# Patient Record
Sex: MALE | Race: WHITE | NOT HISPANIC OR LATINO | Employment: UNEMPLOYED | URBAN - METROPOLITAN AREA
[De-identification: names, ages, dates, MRNs, and addresses within clinical notes are randomized per-mention and may not be internally consistent; named-entity substitution may affect disease eponyms.]

---

## 2023-02-18 ENCOUNTER — OFFICE VISIT (OUTPATIENT)
Dept: URGENT CARE | Facility: CLINIC | Age: 43
End: 2023-02-18

## 2023-02-18 VITALS
OXYGEN SATURATION: 99 % | TEMPERATURE: 97.1 F | WEIGHT: 198.2 LBS | BODY MASS INDEX: 29.36 KG/M2 | HEIGHT: 69 IN | DIASTOLIC BLOOD PRESSURE: 102 MMHG | HEART RATE: 66 BPM | RESPIRATION RATE: 18 BRPM | SYSTOLIC BLOOD PRESSURE: 142 MMHG

## 2023-02-18 DIAGNOSIS — Z02.4 DRIVER'S PERMIT PE (PHYSICAL EXAMINATION): Primary | ICD-10-CM

## 2023-02-18 NOTE — PROGRESS NOTES
[address] [facility]        NAME: Javon Parada is a 43 y o  male  : 1980    MRN: [id number]  DATE: 2023  TIME: 10:05 AM    Assessment and Plan   's permit PE (physical examination) [Z02 4]  1  's permit PE (physical examination)          BP elevated here but patient denies CP, SOB, headaches, dizziness, visual changes  Recent life stressor with move from [facility]  Patient is planning to establish with PCP next door       Patient Instructions       Follow up with PCP in 3-5 days  Proceed to  ER if symptoms worsen  Chief Complaint     Chief Complaint   Patient presents with   • Annual Exam     Drivers permit         History of Present Illness       Patient is a 43 male here for permit physical  No significant past medical or surgical history  Not on any medications  Denies any cardiac or neurologic conditions, epilepsy, diabetes, hypertension, circulatory disorders, cognitive impairment, and any alcohol or drug abuse  Review of Systems   Review of Systems   Constitutional: Negative  Negative for chills, diaphoresis, fatigue, fever and unexpected weight change  HENT: Negative  Negative for hearing loss, nosebleeds, sinus pressure, sinus pain, sore throat, trouble swallowing and voice change  Eyes: Negative  Negative for visual disturbance  Respiratory: Negative  Negative for chest tightness, shortness of breath and wheezing  Cardiovascular: Negative  Negative for chest pain and palpitations  Gastrointestinal: Negative  Negative for abdominal pain, diarrhea, nausea and vomiting  Endocrine: Negative  Genitourinary: Negative  Negative for dysuria  Musculoskeletal: Negative  Negative for back pain and neck pain  Skin: Negative  Negative for pallor and rash  Allergic/Immunologic: Negative  Neurological: Negative  Negative for dizziness, seizures, syncope, weakness, light-headedness, numbness and headaches  Hematological: Negative  Psychiatric/Behavioral: Negative  Current Medications     No current outpatient medications on file  Current Allergies     Allergies as of 02/18/2023   • (No Known Allergies)            The following portions of the patient's history were reviewed and updated as appropriate: allergies, current medications, past family history, past medical history, past social history, past surgical history and problem list      No past medical history on file  No past surgical history on file  No family history on file  Medications have been verified  Objective   BP (!) 142/102   Pulse 66   Temp (!) 97 1 °F (36 2 °C)   Resp 18   Ht 5' 9 29" (1 76 m)   Wt 89 9 kg (198 lb 3 2 oz)   SpO2 99%   BMI 29 02 kg/m²        Physical Exam     Physical Exam  Constitutional:       Appearance: Normal appearance  HENT:      Head: Normocephalic  Right Ear: Tympanic membrane, ear canal and external ear normal       Left Ear: Tympanic membrane, ear canal and external ear normal       Nose: Nose normal       Mouth/Throat:      Mouth: Mucous membranes are moist       Pharynx: Oropharynx is clear  Eyes:      Extraocular Movements: Extraocular movements intact  Conjunctiva/sclera: Conjunctivae normal       Pupils: Pupils are equal, round, and reactive to light  Cardiovascular:      Rate and Rhythm: Normal rate and regular rhythm  Pulses: Normal pulses  Heart sounds: Normal heart sounds  Pulmonary:      Effort: Pulmonary effort is normal       Breath sounds: Normal breath sounds  Abdominal:      General: Bowel sounds are normal       Palpations: Abdomen is soft  Tenderness: There is no abdominal tenderness  Hernia: No hernia is present  Musculoskeletal:         General: No swelling, tenderness or deformity  Normal range of motion  Cervical back: Normal range of motion  Right lower leg: No edema  Left lower leg: No edema     Skin:     General: Skin is warm and dry       Capillary Refill: Capillary refill takes less than 2 seconds  Neurological:      General: No focal deficit present  Mental Status: He is alert and oriented to person, place, and time  Motor: Motor function is intact  Coordination: Coordination is intact  Gait: Gait is intact  Deep Tendon Reflexes: Reflexes are normal and symmetric     Psychiatric:         Mood and Affect: Mood normal          Behavior: Behavior normal

## 2023-05-26 ENCOUNTER — OFFICE VISIT (OUTPATIENT)
Dept: URGENT CARE | Facility: CLINIC | Age: 43
End: 2023-05-26

## 2023-05-26 VITALS
SYSTOLIC BLOOD PRESSURE: 136 MMHG | BODY MASS INDEX: 28.09 KG/M2 | TEMPERATURE: 97.7 F | DIASTOLIC BLOOD PRESSURE: 92 MMHG | HEART RATE: 83 BPM | RESPIRATION RATE: 18 BRPM | OXYGEN SATURATION: 97 % | WEIGHT: 191.8 LBS

## 2023-05-26 DIAGNOSIS — M54.50 ACUTE LEFT-SIDED LOW BACK PAIN WITHOUT SCIATICA: Primary | ICD-10-CM

## 2023-05-26 LAB
SL AMB  POCT GLUCOSE, UA: NEGATIVE
SL AMB LEUKOCYTE ESTERASE,UA: NEGATIVE
SL AMB POCT BILIRUBIN,UA: NEGATIVE
SL AMB POCT BLOOD,UA: NEGATIVE
SL AMB POCT CLARITY,UA: CLEAR
SL AMB POCT COLOR,UA: NORMAL
SL AMB POCT KETONES,UA: NEGATIVE
SL AMB POCT NITRITE,UA: NEGATIVE
SL AMB POCT PH,UA: 6
SL AMB POCT SPECIFIC GRAVITY,UA: 1.03
SL AMB POCT URINE PROTEIN: NEGATIVE
SL AMB POCT UROBILINOGEN: 0.2

## 2023-05-26 RX ORDER — METHOCARBAMOL 500 MG/1
500 TABLET, FILM COATED ORAL 3 TIMES DAILY
Qty: 9 TABLET | Refills: 0 | Status: SHIPPED | OUTPATIENT
Start: 2023-05-26

## 2023-05-26 RX ORDER — AMOXICILLIN AND CLAVULANATE POTASSIUM 875; 125 MG/1; MG/1
875 TABLET, FILM COATED ORAL 2 TIMES DAILY
COMMUNITY
Start: 2023-05-20 | End: 2023-05-30

## 2023-05-26 RX ORDER — PREDNISONE 20 MG/1
TABLET ORAL
COMMUNITY
Start: 2023-05-20

## 2023-05-26 NOTE — PROGRESS NOTES
[address] [facility]        NAME: Dione Sandy is a 43 y o  male  : 1980    MRN: [id number]  DATE: May 26, 2023  TIME: 2:46 PM    Assessment and Plan   Acute left-sided low back pain without sciatica [M54 50]  1  Acute left-sided low back pain without sciatica  POCT urine dip    methocarbamol (ROBAXIN) 500 mg tablet            Patient Instructions   Take Robaxin as prescribed  Take advil as directed by bottle  Make sure you are taking the medication with food  Do not drive or operate heavy machinery while taking Robaxin  Patient is to apply heat 20 minutes on and than 20 minutes off  Patient can continue to use icy/hot or try otc lidocaine patches but neither should be applied while using heat  Rest (for no longer than 24 hours)  Stretching exercises  Alternate ice and heat  Consider physical therapy if no improvement after 1 week  F/u with PCP in 1 week if back pain is not improving  Follow up with chiropractic or orthopedic if continues >1 month  Report to the ER with worsening pain, lower extremity numbness/tingling/weakness or loss of bowel/bladder function  Follow up with PCP in 3-5 days  Proceed to  ER if symptoms worsen  Chief Complaint     Chief Complaint   Patient presents with   • Back Pain     Pt  States this morning he started having back pain on the left side of his back around the kidneys  Pt  Denies any burning with urination  Pt  Has taken advil this morning with no relief  History of Present Illness       HPI   used [id number]  This is a 44 y/o male here c/o left lower back pain which radiates down his left leg since this morning  Denies any injury or trauma to the leg  Denies any urinary symptoms, numbness/tingling of the lower extremities, loss of bowel or bladder function  Rates pain a 7/10  Took advil for the symptoms  Denies fever, chills, SOB, chest pain, n/v/d  Review of Systems   Review of Systems   Constitutional: Negative for chills and fever  Respiratory: Negative for cough and shortness of breath  Cardiovascular: Negative for chest pain  Gastrointestinal: Negative for diarrhea, nausea and vomiting  Genitourinary: Negative for dysuria, frequency, hematuria and urgency  Musculoskeletal: Positive for back pain  Current Medications       Current Outpatient Medications:   •  amoxicillin-clavulanate (AUGMENTIN) 875-125 mg per tablet, Take 875 mg by mouth 2 (two) times a day, Disp: , Rfl:   •  methocarbamol (ROBAXIN) 500 mg tablet, Take 1 tablet (500 mg total) by mouth 3 (three) times a day, Disp: 9 tablet, Rfl: 0  •  predniSONE 20 mg tablet, , Disp: , Rfl:     Current Allergies     Allergies as of 05/26/2023 - Reviewed 05/26/2023   Allergen Reaction Noted   • Strawberry extract - food allergy Rash 03/06/2023            The following portions of the patient's history were reviewed and updated as appropriate: allergies, current medications, past family history, past medical history, past social history, past surgical history and problem list      History reviewed  No pertinent past medical history  History reviewed  No pertinent surgical history  History reviewed  No pertinent family history  Medications have been verified  Objective   /92   Pulse 83   Temp 97 7 °F (36 5 °C)   Resp 18   Wt 87 kg (191 lb 12 8 oz)   SpO2 97%   BMI 28 09 kg/m²        Physical Exam     Physical Exam  Vitals and nursing note reviewed  Constitutional:       General: He is not in acute distress  Appearance: Normal appearance  He is not ill-appearing or toxic-appearing  Cardiovascular:      Rate and Rhythm: Normal rate and regular rhythm  Pulmonary:      Effort: Pulmonary effort is normal       Breath sounds: Normal breath sounds  Abdominal:      General: Abdomen is flat  There is no distension  Palpations: Abdomen is soft  Tenderness: There is no abdominal tenderness   There is no right CVA tenderness, left CVA tenderness or guarding  Musculoskeletal:      Thoracic back: Normal       Lumbar back: Tenderness present  No swelling, lacerations, spasms or bony tenderness  Decreased range of motion  Negative right straight leg raise test and negative left straight leg raise test       Comments: Tenderness to the right lumbar paraspinal muscles  Decreased ROM with flexion due to pain  Neurological:      Mental Status: He is alert        Gait: Gait normal

## 2024-11-09 ENCOUNTER — HOSPITAL ENCOUNTER (INPATIENT)
Facility: HOSPITAL | Age: 44
LOS: 1 days | Discharge: HOME | DRG: 347 | End: 2024-11-12
Attending: EMERGENCY MEDICINE | Admitting: FAMILY MEDICINE
Payer: COMMERCIAL

## 2024-11-09 DIAGNOSIS — M54.9 BACK PAIN: Primary | ICD-10-CM

## 2024-11-09 DIAGNOSIS — R26.2 AMBULATORY DYSFUNCTION: ICD-10-CM

## 2024-11-09 DIAGNOSIS — M54.2 NECK PAIN: ICD-10-CM

## 2024-11-09 PROBLEM — M54.59 INTRACTABLE LOW BACK PAIN: Status: ACTIVE | Noted: 2024-11-09

## 2024-11-09 PROBLEM — I95.9 HYPOTENSION: Status: ACTIVE | Noted: 2024-11-09

## 2024-11-09 LAB
ERYTHROCYTE [DISTWIDTH] IN BLOOD BY AUTOMATED COUNT: 12.8 % (ref 11.6–15.1)
GLUCOSE SERPL-MCNC: 107 MG/DL (ref 65–140)
HCT VFR BLD AUTO: 47.2 % (ref 36.5–49.3)
HGB BLD-MCNC: 15.9 G/DL (ref 12–17)
MCH RBC QN AUTO: 29.9 PG (ref 26.8–34.3)
MCHC RBC AUTO-ENTMCNC: 33.7 G/DL (ref 31.4–37.4)
MCV RBC AUTO: 89 FL (ref 82–98)
PLATELET # BLD AUTO: 314 THOUSANDS/UL (ref 149–390)
PMV BLD AUTO: 10.6 FL (ref 8.9–12.7)
RBC # BLD AUTO: 5.31 MILLION/UL (ref 3.88–5.62)
WBC # BLD AUTO: 13.18 THOUSAND/UL (ref 4.31–10.16)

## 2024-11-09 PROCEDURE — 85027 COMPLETE CBC AUTOMATED: CPT | Performed by: PHYSICIAN ASSISTANT

## 2024-11-09 PROCEDURE — 80053 COMPREHEN METABOLIC PANEL: CPT | Performed by: PHYSICIAN ASSISTANT

## 2024-11-09 PROCEDURE — 99285 EMERGENCY DEPT VISIT HI MDM: CPT | Performed by: EMERGENCY MEDICINE

## 2024-11-09 PROCEDURE — 82948 REAGENT STRIP/BLOOD GLUCOSE: CPT

## 2024-11-09 PROCEDURE — 99283 EMERGENCY DEPT VISIT LOW MDM: CPT

## 2024-11-09 PROCEDURE — 36415 COLL VENOUS BLD VENIPUNCTURE: CPT | Performed by: PHYSICIAN ASSISTANT

## 2024-11-09 PROCEDURE — 96372 THER/PROPH/DIAG INJ SC/IM: CPT

## 2024-11-09 RX ORDER — METHOCARBAMOL 500 MG/1
500 TABLET, FILM COATED ORAL EVERY 6 HOURS SCHEDULED
Status: DISCONTINUED | OUTPATIENT
Start: 2024-11-10 | End: 2024-11-09

## 2024-11-09 RX ORDER — LIDOCAINE 50 MG/G
3 PATCH TOPICAL DAILY PRN
Status: DISCONTINUED | OUTPATIENT
Start: 2024-11-09 | End: 2024-11-12 | Stop reason: HOSPADM

## 2024-11-09 RX ORDER — MAGNESIUM HYDROXIDE/ALUMINUM HYDROXICE/SIMETHICONE 120; 1200; 1200 MG/30ML; MG/30ML; MG/30ML
30 SUSPENSION ORAL EVERY 6 HOURS PRN
Status: DISCONTINUED | OUTPATIENT
Start: 2024-11-09 | End: 2024-11-12 | Stop reason: HOSPADM

## 2024-11-09 RX ORDER — METHYLPREDNISOLONE SODIUM SUCCINATE 125 MG/2ML
125 INJECTION, POWDER, LYOPHILIZED, FOR SOLUTION INTRAMUSCULAR; INTRAVENOUS ONCE
Status: COMPLETED | OUTPATIENT
Start: 2024-11-09 | End: 2024-11-09

## 2024-11-09 RX ORDER — NICOTINE 21 MG/24HR
1 PATCH, TRANSDERMAL 24 HOURS TRANSDERMAL DAILY
Status: DISCONTINUED | OUTPATIENT
Start: 2024-11-10 | End: 2024-11-12 | Stop reason: HOSPADM

## 2024-11-09 RX ORDER — ALPRAZOLAM 0.25 MG/1
0.25 TABLET ORAL DAILY PRN
COMMUNITY
Start: 2024-08-13 | End: 2024-11-12

## 2024-11-09 RX ORDER — POLYETHYLENE GLYCOL 3350 17 G/17G
17 POWDER, FOR SOLUTION ORAL DAILY PRN
Status: DISCONTINUED | OUTPATIENT
Start: 2024-11-09 | End: 2024-11-12 | Stop reason: HOSPADM

## 2024-11-09 RX ORDER — OXYCODONE HYDROCHLORIDE 5 MG/1
5 TABLET ORAL EVERY 6 HOURS PRN
Qty: 15 TABLET | Refills: 0 | Status: SHIPPED | OUTPATIENT
Start: 2024-11-09 | End: 2024-11-12

## 2024-11-09 RX ORDER — SILDENAFIL 100 MG/1
100 TABLET, FILM COATED ORAL DAILY PRN
COMMUNITY
Start: 2024-11-08 | End: 2024-11-12

## 2024-11-09 RX ORDER — ACETAMINOPHEN 10 MG/ML
1000 INJECTION, SOLUTION INTRAVENOUS EVERY 8 HOURS PRN
Status: DISCONTINUED | OUTPATIENT
Start: 2024-11-09 | End: 2024-11-10

## 2024-11-09 RX ORDER — PREDNISONE 20 MG/1
40 TABLET ORAL DAILY
Qty: 10 TABLET | Refills: 0 | Status: SHIPPED | OUTPATIENT
Start: 2024-11-10 | End: 2024-11-12

## 2024-11-09 RX ORDER — ALPRAZOLAM 0.25 MG/1
0.25 TABLET ORAL
Status: DISCONTINUED | OUTPATIENT
Start: 2024-11-09 | End: 2024-11-12 | Stop reason: HOSPADM

## 2024-11-09 RX ORDER — SERTRALINE HYDROCHLORIDE 100 MG/1
100 TABLET, FILM COATED ORAL DAILY
COMMUNITY
Start: 2024-11-08 | End: 2024-11-12

## 2024-11-09 RX ORDER — METHOCARBAMOL 500 MG/1
500 TABLET, FILM COATED ORAL EVERY 6 HOURS SCHEDULED
Status: DISCONTINUED | OUTPATIENT
Start: 2024-11-10 | End: 2024-11-10

## 2024-11-09 RX ORDER — MORPHINE SULFATE 10 MG/ML
8 INJECTION, SOLUTION INTRAMUSCULAR; INTRAVENOUS ONCE
Status: COMPLETED | OUTPATIENT
Start: 2024-11-09 | End: 2024-11-09

## 2024-11-09 RX ORDER — KETOROLAC TROMETHAMINE 30 MG/ML
15 INJECTION, SOLUTION INTRAMUSCULAR; INTRAVENOUS EVERY 6 HOURS PRN
Status: DISCONTINUED | OUTPATIENT
Start: 2024-11-09 | End: 2024-11-10

## 2024-11-09 RX ORDER — SERTRALINE HYDROCHLORIDE 100 MG/1
100 TABLET, FILM COATED ORAL DAILY
Status: DISCONTINUED | OUTPATIENT
Start: 2024-11-10 | End: 2024-11-12 | Stop reason: HOSPADM

## 2024-11-09 RX ADMIN — METHYLPREDNISOLONE SODIUM SUCCINATE 125 MG: 125 INJECTION, POWDER, FOR SOLUTION INTRAMUSCULAR; INTRAVENOUS at 21:59

## 2024-11-09 RX ADMIN — MORPHINE SULFATE 8 MG: 10 INJECTION INTRAVENOUS at 21:59

## 2024-11-09 NOTE — Clinical Note
Garth Lo was seen and treated in our emergency department on 11/9/2024.                Diagnosis:     Garth  may return to work on return date.    He may return on this date: 11/16/2024         If you have any questions or concerns, please don't hesitate to call.      Tim Carrero MD    ______________________________           _______________          _______________  Hospital Representative                              Date                                Time

## 2024-11-10 ENCOUNTER — APPOINTMENT (OUTPATIENT)
Dept: RADIOLOGY | Facility: HOSPITAL | Age: 44
DRG: 347 | End: 2024-11-10
Payer: COMMERCIAL

## 2024-11-10 LAB
ALBUMIN SERPL BCG-MCNC: 4.1 G/DL (ref 3.5–5)
ALP SERPL-CCNC: 69 U/L (ref 34–104)
ALT SERPL W P-5'-P-CCNC: 15 U/L (ref 7–52)
ANION GAP SERPL CALCULATED.3IONS-SCNC: 7 MMOL/L (ref 4–13)
AST SERPL W P-5'-P-CCNC: 18 U/L (ref 13–39)
BILIRUB SERPL-MCNC: 0.34 MG/DL (ref 0.2–1)
BUN SERPL-MCNC: 13 MG/DL (ref 5–25)
CALCIUM SERPL-MCNC: 9.1 MG/DL (ref 8.4–10.2)
CHLORIDE SERPL-SCNC: 107 MMOL/L (ref 96–108)
CO2 SERPL-SCNC: 24 MMOL/L (ref 21–32)
CREAT SERPL-MCNC: 1.06 MG/DL (ref 0.6–1.3)
GFR SERPL CREATININE-BSD FRML MDRD: 84 ML/MIN/1.73SQ M
GLUCOSE SERPL-MCNC: 92 MG/DL (ref 65–140)
POTASSIUM SERPL-SCNC: 4 MMOL/L (ref 3.5–5.3)
PROT SERPL-MCNC: 6.9 G/DL (ref 6.4–8.4)
SODIUM SERPL-SCNC: 138 MMOL/L (ref 135–147)

## 2024-11-10 PROCEDURE — 99223 1ST HOSP IP/OBS HIGH 75: CPT | Performed by: PHYSICIAN ASSISTANT

## 2024-11-10 PROCEDURE — 99232 SBSQ HOSP IP/OBS MODERATE 35: CPT | Performed by: FAMILY MEDICINE

## 2024-11-10 PROCEDURE — 72110 X-RAY EXAM L-2 SPINE 4/>VWS: CPT

## 2024-11-10 RX ORDER — ACETAMINOPHEN 325 MG/1
975 TABLET ORAL EVERY 8 HOURS SCHEDULED
Status: DISCONTINUED | OUTPATIENT
Start: 2024-11-10 | End: 2024-11-12 | Stop reason: HOSPADM

## 2024-11-10 RX ORDER — LIDOCAINE 50 MG/G
1 PATCH TOPICAL DAILY
Status: DISCONTINUED | OUTPATIENT
Start: 2024-11-10 | End: 2024-11-12 | Stop reason: HOSPADM

## 2024-11-10 RX ORDER — DEXAMETHASONE SODIUM PHOSPHATE 10 MG/ML
8 INJECTION, SOLUTION INTRAMUSCULAR; INTRAVENOUS ONCE
Status: COMPLETED | OUTPATIENT
Start: 2024-11-10 | End: 2024-11-10

## 2024-11-10 RX ORDER — KETOROLAC TROMETHAMINE 30 MG/ML
15 INJECTION, SOLUTION INTRAMUSCULAR; INTRAVENOUS EVERY 6 HOURS SCHEDULED
Status: DISCONTINUED | OUTPATIENT
Start: 2024-11-10 | End: 2024-11-11

## 2024-11-10 RX ORDER — OXYCODONE HYDROCHLORIDE 5 MG/1
5 TABLET ORAL EVERY 4 HOURS PRN
Status: DISCONTINUED | OUTPATIENT
Start: 2024-11-10 | End: 2024-11-11

## 2024-11-10 RX ORDER — METHOCARBAMOL 500 MG/1
1000 TABLET, FILM COATED ORAL EVERY 6 HOURS SCHEDULED
Status: DISCONTINUED | OUTPATIENT
Start: 2024-11-10 | End: 2024-11-11

## 2024-11-10 RX ADMIN — DEXAMETHASONE SODIUM PHOSPHATE 8 MG: 10 INJECTION, SOLUTION INTRAMUSCULAR; INTRAVENOUS at 10:20

## 2024-11-10 RX ADMIN — METHOCARBAMOL TABLETS 1000 MG: 500 TABLET, COATED ORAL at 13:07

## 2024-11-10 RX ADMIN — METHOCARBAMOL TABLETS 500 MG: 500 TABLET, COATED ORAL at 00:11

## 2024-11-10 RX ADMIN — KETOROLAC TROMETHAMINE 15 MG: 30 INJECTION, SOLUTION INTRAMUSCULAR at 22:14

## 2024-11-10 RX ADMIN — LIDOCAINE 1 PATCH: 50 PATCH CUTANEOUS at 09:31

## 2024-11-10 RX ADMIN — LIDOCAINE 3 PATCH: 50 PATCH CUTANEOUS at 00:20

## 2024-11-10 RX ADMIN — SERTRALINE 100 MG: 100 TABLET, FILM COATED ORAL at 09:30

## 2024-11-10 RX ADMIN — KETOROLAC TROMETHAMINE 15 MG: 30 INJECTION, SOLUTION INTRAMUSCULAR at 00:28

## 2024-11-10 RX ADMIN — METHOCARBAMOL TABLETS 1000 MG: 500 TABLET, COATED ORAL at 17:09

## 2024-11-10 RX ADMIN — ACETAMINOPHEN 975 MG: 325 TABLET, FILM COATED ORAL at 15:00

## 2024-11-10 RX ADMIN — ACETAMINOPHEN 975 MG: 325 TABLET, FILM COATED ORAL at 09:30

## 2024-11-10 RX ADMIN — OXYCODONE HYDROCHLORIDE 5 MG: 5 TABLET ORAL at 17:16

## 2024-11-10 RX ADMIN — KETOROLAC TROMETHAMINE 15 MG: 30 INJECTION, SOLUTION INTRAMUSCULAR at 15:01

## 2024-11-10 RX ADMIN — KETOROLAC TROMETHAMINE 15 MG: 30 INJECTION, SOLUTION INTRAMUSCULAR at 09:31

## 2024-11-10 RX ADMIN — METHOCARBAMOL TABLETS 500 MG: 500 TABLET, COATED ORAL at 05:54

## 2024-11-10 RX ADMIN — OXYCODONE HYDROCHLORIDE 5 MG: 5 TABLET ORAL at 09:30

## 2024-11-10 NOTE — ASSESSMENT & PLAN NOTE
Episode while in the ED of hypotension after receiving IV morphine, currently BP is stabilizing with systolic in the 140s  Patient also had syncopal episode after receiving morphine and is still drowsy, but easily alert to name and answers all questions appropriately, alert and oriented x 4  Will avoid further IV morphine doses at this time and patient requesting not to have any more IV morphine  Monitor BP  This has resolved

## 2024-11-10 NOTE — ASSESSMENT & PLAN NOTE
Secondary to acute on chronic low back pain with radiation down the legs  PT/OT eval  MRI C and L-spine ordered given significant pain.  Patient is even having difficulty moving in bed

## 2024-11-10 NOTE — PROGRESS NOTES
Progress Note - Hospitalist   Name: Garth Lo 44 y.o. male I MRN: [id number]  Unit/Bed#: -01 I Date of Admission: 11/9/2024   Date of Service: 11/10/2024 I Hospital Day: 0    Assessment & Plan  Intractable low back pain  Acute on chronic bilateral lumbar back pain with radiation of pain down legs without numbness, no relief with over-the-counter pain medications  Patient did follow-up outpatient with physical therapy and PCP who did order an outpatient MRI, but due to worsening pain came to the ED with no relief after IV Solu-Medrol IV morphine so will order MRI C and L-spine while here  As needed pain medication, start Robaxin every 6 hours scheduled.  May need to consider orthopedic evaluation once MRI is done  PT/OT eval  Ambulatory dysfunction  Secondary to acute on chronic low back pain with radiation down the legs  PT/OT eval  MRI C and L-spine ordered given significant pain.  Patient is even having difficulty moving in bed  Cervical spine pain  Acute on chronic, pain with associated bilateral arm pain and tingling down arms into fourth and fifth fingers which is ongoing for 3 years after injury in the [place] war, no change in tingling but pain is worse  MRI C-spine ordered  As needed pain medication and scheduled muscle relaxer  PT/OT eval.  Will likely evaluate after MRI  Hypotension  Episode while in the ED of hypotension after receiving IV morphine, currently BP is stabilizing with systolic in the 140s  Patient also had syncopal episode after receiving morphine and is still drowsy, but easily alert to name and answers all questions appropriately, alert and oriented x 4  Will avoid further IV morphine doses at this time and patient requesting not to have any more IV morphine  Monitor BP  This has resolved    VTE Pharmacologic Prophylaxis: VTE Score: 2 Low Risk (Score 0-2) - Encourage Ambulation.    Mobility:   Basic Mobility Inpatient Raw Score: 20  JH-HLM Goal: 6: Walk 10 steps or more  JH-HLM  Achieved: 7: Walk 25 feet or more  -HL Goal achieved. Continue to encourage appropriate mobility.    Patient Centered Rounds: I performed bedside rounds with nursing staff today.       Education and Discussions with Family / Patient: Updated  (wife) at bedside.    Current Length of Stay: 0 day(s)  Current Patient Status: Observation   Certification Statement: The patient, admitted on an observation basis, will now require > 2 midnight hospital stay due to having intractable pain needing multimodal pain regimen as well as further workup with a MRI.  Will keep the patient observation for now.  If he is able to be discharged tomorrow that is fine but if he needs hospitalization beyond tomorrow with ongoing pain management may consider switching over to inpatient at that time  Discharge Plan: Anticipate discharge in 24-48 hrs to home.    Code Status: Level 1 - Full Code    Subjective   Patient seen and examined.  States his pain is really significant.    Objective :  Temp:  [97.4 °F (36.3 °C)-98.6 °F (37 °C)] 97.4 °F (36.3 °C)  HR:  [46-94] 89  BP: ()/(62-95) 130/88  Resp:  [14-18] 18  SpO2:  [93 %-99 %] 96 %  O2 Device: None (Room air)  Nasal Cannula O2 Flow Rate (L/min):  [3 L/min] 3 L/min    Body mass index is 28.73 kg/m².     Input and Output Summary (last 24 hours):   No intake or output data in the 24 hours ending 11/10/24 1037    Physical Exam  General Appearance:    Alert, cooperative, no distress, appears stated age                               Lungs:     Clear to auscultation bilaterally, respirations unlabored       Heart:    Regular rate and rhythm, S1 and S2 normal, no murmur, rub    or gallop   Abdomen:     Soft, non-tender, bowel sounds active all four quadrants,     no masses, no organomegaly           Extremities:   Extremities normal, atraumatic, no cyanosis or edema   Straight leg raise positive bilateral.                      Lines/Drains:              Lab Results: I have  reviewed the following results:   Results from last 7 days   Lab Units 11/09/24  2322   WBC Thousand/uL 13.18*   HEMOGLOBIN g/dL 15.9   HEMATOCRIT % 47.2   PLATELETS Thousands/uL 314     Results from last 7 days   Lab Units 11/09/24  2322   SODIUM mmol/L 138   POTASSIUM mmol/L 4.0   CHLORIDE mmol/L 107   CO2 mmol/L 24   BUN mg/dL 13   CREATININE mg/dL 1.06   ANION GAP mmol/L 7   CALCIUM mg/dL 9.1   ALBUMIN g/dL 4.1   TOTAL BILIRUBIN mg/dL 0.34   ALK PHOS U/L 69   ALT U/L 15   AST U/L 18   GLUCOSE RANDOM mg/dL 92         Results from last 7 days   Lab Units 11/09/24  2226   POC GLUCOSE mg/dl 107               Recent Cultures (last 7 days):         Imaging Results Review: No pertinent imaging studies reviewed.  Other Study Results Review: No additional pertinent studies reviewed.    Last 24 Hours Medication List:     Current Facility-Administered Medications:     acetaminophen (TYLENOL) tablet 975 mg, Q8H CROW    ALPRAZolam (XANAX) tablet 0.25 mg, HS PRN    aluminum-magnesium hydroxide-simethicone (MAALOX) oral suspension 30 mL, Q6H PRN    ketorolac (TORADOL) injection 15 mg, Q6H CROW    lidocaine (LIDODERM) 5 % patch 1 patch, Daily    lidocaine (LIDODERM) 5 % patch 3 patch, Daily PRN    methocarbamol (ROBAXIN) tablet 1,000 mg, Q6H CROW    nicotine (NICODERM CQ) 14 mg/24hr TD 24 hr patch 1 patch, Daily    oxyCODONE (ROXICODONE) IR tablet 5 mg, Q4H PRN    polyethylene glycol (MIRALAX) packet 17 g, Daily PRN    sertraline (ZOLOFT) tablet 100 mg, Daily    Administrative Statements   Today, Patient Was Seen By: Tylor Kennedy MD  I have spent a total time of 20 minutes in caring for this patient on the day of the visit/encounter including Diagnostic results, Patient and family education, Impressions, Counseling / Coordination of care, Documenting in the medical record, Reviewing / ordering tests, medicine, procedures  , and Obtaining or reviewing history  .    **Please Note: This note may have been constructed using a  voice recognition system.**

## 2024-11-10 NOTE — ED NOTES
Pt attempted to sit at the side of the bed to go home, felt lightheaded and had a syncopal episode. Dr Carrero called to the bedside, pt assessed. Pt attached to the monitor and is resting at this time.      Nila Carmona RN  11/09/24 1829

## 2024-11-10 NOTE — ASSESSMENT & PLAN NOTE
Acute on chronic bilateral lumbar back pain with radiation of pain down legs without numbness, no relief with over-the-counter pain medications  Patient did follow-up outpatient with physical therapy and PCP who did order an outpatient MRI, but due to worsening pain came to the ED with no relief after IV Solu-Medrol IV morphine so will order MRI C and L-spine while here  As needed pain medication, start Robaxin every 6 hours scheduled  PT/OT eval

## 2024-11-10 NOTE — PHYSICAL THERAPY NOTE
Physical Therapy Cancellation Note    PT order received. Chart review performed. At this time, PT evaluation cancelled as patient is pending MRI of cervical and lumbar spine. PT to continue to follow and evaluate when appropriate.     11/10/24 0709   PT Last Visit   PT Visit Date 11/10/24   Note Type   Note type Evaluation;Cancelled Session   Cancel Reasons Medical status       Bibi Morrison, PT, DPT

## 2024-11-10 NOTE — ED PROVIDER NOTES
Time reflects when diagnosis was documented in both MDM as applicable and the Disposition within this note       Time User Action Codes Description Comment    11/9/2024 10:01 PM Carrero, Tim Add [M54.9] Back pain     11/9/2024 10:01 PM Carrero, Tim Add [M54.2] Neck pain     11/9/2024 11:08 PM Carrero, Tim Add [R26.2] Ambulatory dysfunction           ED Disposition       ED Disposition   Admit    Condition   Stable    Date/Time   Sat Nov 9, 2024 11:08 PM    Comment   Case was discussed with Darlene and the patient's admission status was agreed to be Admission Status: observation status to the service of Dr. Pantoja .               Assessment & Plan       Medical Decision Making  Problems Addressed:  Ambulatory dysfunction: acute illness or injury  Back pain: chronic illness or injury  Neck pain: chronic illness or injury    Amount and/or Complexity of Data Reviewed  Labs: ordered.    Risk  Prescription drug management.  Decision regarding hospitalization.        ED Course as of 11/10/24 1342   Sat Nov 09, 2024   2201 I have reasonably determined that electronically prescribing a controlled substance would be impractical for the patient to obtain the controlled substance prescribed by electronic prescription or would cause an untimely delay resulting in an adverse impact on the patient's medical condition.           Medications   morphine injection 8 mg (8 mg Intramuscular Given 11/9/24 2159)   methylPREDNISolone sodium succinate (Solu-MEDROL) injection 125 mg (125 mg Intramuscular Given 11/9/24 2159)       ED Risk Strat Scores                           SBIRT 20yo+      Flowsheet Row Most Recent Value   Initial Alcohol Screen: US AUDIT-C     1. How often do you have a drink containing alcohol? 0 Filed at: 11/09/2024 2121   2. How many drinks containing alcohol do you have on a typical day you are drinking?  0 Filed at: 11/09/2024 2121   3a. Male UNDER 65: How often do you have five or more drinks on one occasion?  0 Filed at: 11/09/2024 2121   Audit-C Score 0 Filed at: 11/09/2024 2121   PRADEEP: How many times in the past year have you...    Used an illegal drug or used a prescription medication for non-medical reasons? Never Filed at: 11/09/2024 2121                            History of Present Illness       Chief Complaint   Patient presents with    Back Pain     Pt reports B/L lower back pain that radiates down both legs as well as neck pain. Pt denies known injury.        History reviewed. No pertinent past medical history.   History reviewed. No pertinent surgical history.   History reviewed. No pertinent family history.   Social History     Tobacco Use    Smoking status: Every Day     Types: Cigarettes    Smokeless tobacco: Never   Vaping Use    Vaping status: Never Used   Substance Use Topics    Alcohol use: Not Currently    Drug use: Never      E-Cigarette/Vaping    E-Cigarette Use Never User       E-Cigarette/Vaping Substances    Nicotine No     THC No     CBD No     Flavoring No     Other No     Unknown No       I have reviewed and agree with the history as documented.     45 yo male with chronic neck and back pain who presents to the ED c/o acute worsening of both with pain radiating to R thigh and b/l leg weakness. No fever. No trauma. No incontinence. No h/o ivda. Additional history from wife at bedside. Unalleviated by PT for the last year. Had outpt MRI ordered yesterday but came to ED todaydue to worsening sxs.         Review of Systems   Constitutional:  Negative for chills and fever.   Genitourinary:  Negative for difficulty urinating.   Musculoskeletal:  Positive for back pain, gait problem and neck pain. Negative for neck stiffness.   Neurological:  Positive for weakness. Negative for numbness.           Objective       ED Triage Vitals [11/09/24 2122]   Temperature Pulse Blood Pressure Respirations SpO2 Patient Position - Orthostatic VS   98.6 °F (37 °C) 94 134/93 18 99 % Sitting      Temp Source Heart Rate  Source BP Location FiO2 (%) Pain Score    Oral Monitor Left arm -- 8      Vitals      Date and Time Temp Pulse SpO2 Resp BP Pain Score FACES Pain Rating User   11/10/24 0931 -- -- -- -- -- 7 -- TM   11/10/24 0930 -- -- -- -- -- 7 -- TM   11/10/24 0725 97.4 °F (36.3 °C) -- 96 % -- -- 7 -- TM   11/10/24 0722 97.4 °F (36.3 °C) 89 94 % -- 130/88 -- -- DII   11/10/24 0043 -- -- -- -- -- 9 -- AMAURI   11/10/24 0028 -- -- -- -- -- 9 -- AMAURI   11/09/24 2355 -- -- -- -- -- 9 -- AMAURI   11/09/24 2349 97.9 °F (36.6 °C) 81 97 % -- 154/95 -- -- DII   11/09/24 2315 -- 67 98 % 18 120/83 -- -- MO   11/09/24 2300 -- 75 94 % 18 101/66 -- -- JK   11/09/24 2252 -- 71 93 % 14 94/62 -- -- K   11/09/24 2248 -- 64 95 % 16 104/64 -- -- MO   11/09/24 2245 -- 46 94 % 18 -- -- -- MO   11/09/24 2230 -- 78 93 % 14 120/80 -- -- K   11/09/24 2122 98.6 °F (37 °C) 94 99 % 18 134/93 8 -- ML            Physical Exam  Vitals and nursing note reviewed.   Constitutional:       General: He is not in acute distress.     Appearance: Normal appearance. He is well-developed. He is not ill-appearing, toxic-appearing or diaphoretic.   HENT:      Head: Normocephalic and atraumatic.      Mouth/Throat:      Mouth: Mucous membranes are moist.      Pharynx: Oropharynx is clear.   Eyes:      General:         Right eye: No discharge.         Left eye: No discharge.      Conjunctiva/sclera: Conjunctivae normal.      Pupils: Pupils are equal, round, and reactive to light.   Neck:      Vascular: No JVD.   Cardiovascular:      Pulses: Normal pulses.   Pulmonary:      Effort: Pulmonary effort is normal. No respiratory distress.      Breath sounds: No stridor.   Musculoskeletal:         General: No swelling, tenderness, deformity or signs of injury. Normal range of motion.      Cervical back: Normal range of motion and neck supple. No rigidity.      Comments: 5/5 strength b/l lower extremities, normal DTRs   Skin:     General: Skin is warm and dry.      Capillary Refill: Capillary  refill takes less than 2 seconds.      Coloration: Skin is not pale.      Findings: No erythema or rash.   Neurological:      General: No focal deficit present.      Mental Status: He is alert and oriented to person, place, and time.      Cranial Nerves: No cranial nerve deficit.      Sensory: No sensory deficit.      Motor: No weakness or abnormal muscle tone.      Coordination: Coordination normal.      Gait: Gait normal.         Results Reviewed       Procedure Component Value Units Date/Time    Comprehensive metabolic panel [[id number]] Collected: 11/09/24 2322    Lab Status: Final result Specimen: Blood from Arm, Right Updated: 11/10/24 0006     Sodium 138 mmol/L      Potassium 4.0 mmol/L      Chloride 107 mmol/L      CO2 24 mmol/L      ANION GAP 7 mmol/L      BUN 13 mg/dL      Creatinine 1.06 mg/dL      Glucose 92 mg/dL      Calcium 9.1 mg/dL      AST 18 U/L      ALT 15 U/L      Alkaline Phosphatase 69 U/L      Total Protein 6.9 g/dL      Albumin 4.1 g/dL      Total Bilirubin 0.34 mg/dL      eGFR 84 ml/min/1.73sq m     Narrative:      National Kid[employer] guidelines for Chronic Kidney Disease (CKD):     Stage 1 with normal or high GFR (GFR > 90 mL/min/1.73 square meters)    Stage 2 Mild CKD (GFR = 60-89 mL/min/1.73 square meters)    Stage 3A Moderate CKD (GFR = 45-59 mL/min/1.73 square meters)    Stage 3B Moderate CKD (GFR = 30-44 mL/min/1.73 square meters)    Stage 4 Severe CKD (GFR = 15-29 mL/min/1.73 square meters)    Stage 5 End Stage CKD (GFR <15 mL/min/1.73 square meters)  Note: GFR calculation is accurate only with a steady state creatinine    CBC and Platelet [[id number]]  (Abnormal) Collected: 11/09/24 2322    Lab Status: Final result Specimen: Blood from Arm, Right Updated: 11/09/24 2332     WBC 13.18 Thousand/uL      RBC 5.31 Million/uL      Hemoglobin 15.9 g/dL      Hematocrit 47.2 %      MCV 89 fL      MCH 29.9 pg      MCHC 33.7 g/dL      RDW 12.8 %      Platelets 314 Thousands/uL       MPV 10.6 fL     Fingerstick Glucose (POCT) [[id number]]  (Normal) Collected: 11/09/24 2226    Lab Status: Final result Specimen: Blood Updated: 11/09/24 2227     POC Glucose 107 mg/dl             MRI inpatient order    (Results Pending)   XR spine lumbar minimum 4 views non injury    (Results Pending)       Procedures    ED Medication and Procedure Management   Prior to Admission Medications   Prescriptions Last Dose Informant Patient Reported? Taking?   ALPRAZolam (XANAX) 0.25 mg tablet Not Taking  Yes No   Sig: Take 0.25 mg by mouth daily as needed   Patient not taking: Reported on 11/9/2024   methocarbamol (ROBAXIN) 500 mg tablet Not Taking  No No   Sig: Take 1 tablet (500 mg total) by mouth 3 (three) times a day   Patient not taking: Reported on 11/9/2024   sertraline (ZOLOFT) 100 mg tablet 11/9/2024  Yes Yes   Sig: Take 100 mg by mouth daily   sildenafil (VIAGRA) 100 mg tablet Not Taking  Yes No   Sig: Take 100 mg by mouth daily as needed   Patient not taking: Reported on 11/9/2024      Facility-Administered Medications: None     Current Discharge Medication List        START taking these medications    Details   oxyCODONE (Roxicodone) 5 immediate release tablet Take 1 tablet (5 mg total) by mouth every 6 (six) hours as needed for moderate pain for up to 15 doses Max Daily Amount: 20 mg  Qty: 15 tablet, Refills: 0    Associated Diagnoses: Neck pain      predniSONE 20 mg tablet Take 2 tablets (40 mg total) by mouth daily Do not start before November 10, 2024.  Qty: 10 tablet, Refills: 0    Associated Diagnoses: Neck pain           CONTINUE these medications which have NOT CHANGED    Details   sertraline (ZOLOFT) 100 mg tablet Take 100 mg by mouth daily      ALPRAZolam (XANAX) 0.25 mg tablet Take 0.25 mg by mouth daily as needed      methocarbamol (ROBAXIN) 500 mg tablet Take 1 tablet (500 mg total) by mouth 3 (three) times a day  Qty: 9 tablet, Refills: 0    Associated Diagnoses: Acute left-sided low back pain  without sciatica      sildenafil (VIAGRA) 100 mg tablet Take 100 mg by mouth daily as needed           No discharge procedures on file.  ED SEPSIS DOCUMENTATION   Time reflects when diagnosis was documented in both MDM as applicable and the Disposition within this note       Time User Action Codes Description Comment    11/9/2024 10:01 PM Tim Carrero [M54.9] Back pain     11/9/2024 10:01 PM Tim Carrero [M54.2] Neck pain     11/9/2024 11:08 PM Tim Carrero [R26.2] Ambulatory dysfunction                  Tim Carrero MD  11/10/24 7173

## 2024-11-10 NOTE — H&P
H&P - Hospitalist   Name: Garth Lo 44 y.o. male I MRN: [id number]  Unit/Bed#: -01 I Date of Admission: 11/9/2024   Date of Service: 11/10/2024 I Hospital Day: 0     Assessment & Plan  Intractable low back pain  Acute on chronic bilateral lumbar back pain with radiation of pain down legs without numbness, no relief with over-the-counter pain medications  Patient did follow-up outpatient with physical therapy and PCP who did order an outpatient MRI, but due to worsening pain came to the ED with no relief after IV Solu-Medrol IV morphine so will order MRI C and L-spine while here  As needed pain medication, start Robaxin every 6 hours scheduled  PT/OT eval  Ambulatory dysfunction  Secondary to acute on chronic low back pain with radiation down the legs  PT/OT eval  MRI C and L-spine ordered  Cervical spine pain  Acute on chronic, pain with associated bilateral arm pain and tingling down arms into fourth and fifth fingers which is ongoing for 3 years after injury in the [place] war, no change in tingling but pain is worse  MRI C-spine ordered  As needed pain medication and scheduled muscle relaxer  PT/OT eval  Hypotension  Episode while in the ED of hypotension after receiving IV morphine, currently BP is stabilizing with systolic in the 140s  Patient also had syncopal episode after receiving morphine and is still drowsy, but easily alert to name and answers all questions appropriately, alert and oriented x 4  Will avoid further IV morphine doses at this time and patient requesting not to have any more IV morphine  Monitor BP      VTE Pharmacologic Prophylaxis: VTE Score: 2 Low Risk (Score 0-2) - Encourage Ambulation.  Code Status: Level 1 - Full Code   Discussion with family: Updated  (wife) at bedside.    Anticipated Length of Stay: Patient will be admitted on an observation basis with an anticipated length of stay of less than 2 midnights secondary to see above.    History of Present  Illness   Chief Complaint:    Chief Complaint   Patient presents with    Back Pain     Pt reports B/L lower back pain that radiates down both legs as well as neck pain. Pt denies known injury.         Garth Lo is a 44 y.o. male with a PMH of chronic low back pain, chronic neck pain, PTSD, anxiety who presents with complaint of gradual worsening of chronic bilateral lower back pain and bilateral neck pain for at least a month.  Patient has had bilateral lower back pain with radiation down legs as well as bilateral cervical neck pain with radiation down arms and tingling down the arms to fourth and fifth fingers for 3 years from being active in the [place]  during the war with [place].  Patient reports the tingling has not been worse in his arms, but the pain in his neck has been worse and more so the pain in his back has been much worse to the point that he has had been having difficulty walking as pain gets worse with ambulation.  Is a  and has been stopping at gas stations and taking over-the-counter medication for pain which she reports has not been helping, unknown what he has been taking.  Denies headaches, chest pain, shortness of breath, bilateral leg numbness or tingling, recent injury.    Review of Systems   Constitutional:  Negative for activity change.   Respiratory:  Negative for shortness of breath.    Cardiovascular:  Negative for chest pain.   Gastrointestinal:  Negative for abdominal pain.   Musculoskeletal:  Positive for back pain and neck pain.   Neurological:  Negative for headaches.       Historical Information   History reviewed. No pertinent past medical history.  History reviewed. No pertinent surgical history.  Social History     Tobacco Use    Smoking status: Every Day     Types: Cigarettes    Smokeless tobacco: Never   Vaping Use    Vaping status: Never Used   Substance and Sexual Activity    Alcohol use: Not Currently    Drug use: Never    Sexual activity: Not on  file     E-Cigarette/Vaping    E-Cigarette Use Never User      E-Cigarette/Vaping Substances    Nicotine No     THC No     CBD No     Flavoring No     Other No     Unknown No      Family history non-contributory  Social History:  Marital Status: /Civil Union     Patient Pre-hospital Living Situation: Home  Patient Pre-hospital Level of Mobility: walks  Patient Pre-hospital Diet Restrictions: n/a    Meds/Allergies   I have reviewed home medications with patient family member. And review of PDMP  Prior to Admission medications    Medication Sig Start Date End Date Taking? Authorizing Provider   oxyCODONE (Roxicodone) 5 immediate release tablet Take 1 tablet (5 mg total) by mouth every 6 (six) hours as needed for moderate pain for up to 15 doses Max Daily Amount: 20 mg 11/9/24  Yes Tim Carrero MD   predniSONE 20 mg tablet Take 2 tablets (40 mg total) by mouth daily Do not start before November 10, 2024. 11/10/24  Yes Tim Carrero MD   sertraline (ZOLOFT) 100 mg tablet Take 100 mg by mouth daily 11/8/24 11/8/25 Yes Historical Provider, MD   ALPRAZolam (XANAX) 0.25 mg tablet Take 0.25 mg by mouth daily as needed  Patient not taking: Reported on 11/9/2024 8/13/24 2/9/25  Historical Provider, MD   methocarbamol (ROBAXIN) 500 mg tablet Take 1 tablet (500 mg total) by mouth 3 (three) times a day  Patient not taking: Reported on 11/9/2024 5/26/23   Jennifer Araiza PA-C   sildenafil (VIAGRA) 100 mg tablet Take 100 mg by mouth daily as needed  Patient not taking: Reported on 11/9/2024 11/8/24 11/8/25  Historical Provider, MD     Allergies   Allergen Reactions    Morphine Rash and Syncope     Pt had syncopal episode after dose of IM Morphine with redness around injection site.     Strawberry Extract - Food Allergy Rash       Objective :  Temp:  [97.9 °F (36.6 °C)-98.6 °F (37 °C)] 97.9 °F (36.6 °C)  HR:  [46-94] 81  BP: ()/(62-95) 154/95  Resp:  [14-18] 18  SpO2:  [93 %-99 %] 97 %  O2 Device: Nasal  cannula  Nasal Cannula O2 Flow Rate (L/min):  [3 L/min] 3 L/min    Physical Exam  Vitals and nursing note reviewed.   Constitutional:       General: He is not in acute distress.     Appearance: Normal appearance. He is not toxic-appearing.   HENT:      Head: Normocephalic.   Cardiovascular:      Rate and Rhythm: Normal rate and regular rhythm.   Pulmonary:      Effort: Pulmonary effort is normal. No respiratory distress.      Breath sounds: Normal breath sounds.   Abdominal:      General: Abdomen is flat. Bowel sounds are normal.      Palpations: Abdomen is soft.   Musculoskeletal:         General: Tenderness present.      Right lower leg: No edema.      Left lower leg: No edema.      Comments: Tenderness to palpation over bilateral lumbar paravertebral muscles and directly over lumbar spine.  Tenderness to palpation over left thoracic paravertebral muscles.  No tenderness to palpation over bilateral hips, cervical spine.  Limited range of motion of bilateral arms and legs due to pain   Skin:     General: Skin is warm and dry.   Neurological:      General: No focal deficit present.      Mental Status: He is alert and oriented to person, place, and time.      Comments: Drowsy after IV morphine, but easily alert to name and stays awake during entire conversation, answers questions appropriately   Psychiatric:         Mood and Affect: Mood normal.         Behavior: Behavior normal.         Thought Content: Thought content normal.          Lines/Drains:            Lab Results: I have reviewed the following results:  Results from last 7 days   Lab Units 11/09/24  2322   WBC Thousand/uL 13.18*   HEMOGLOBIN g/dL 15.9   HEMATOCRIT % 47.2   PLATELETS Thousands/uL 314             Results from last 7 days   Lab Units 11/09/24  2226   POC GLUCOSE mg/dl 107     Lab Results   Component Value Date    HGBA1C 5.5 03/21/2024           Imaging Results Review: No pertinent imaging studies reviewed.  Other Study Results Review: No  additional pertinent studies reviewed.    Administrative Statements   I have spent a total time of 68 minutes in caring for this patient on the day of the visit/encounter including Diagnostic results, Instructions for management, Patient and family education, Impressions, Counseling / Coordination of care, Documenting in the medical record, Reviewing / ordering tests, medicine, procedures  , and Obtaining or reviewing history  .    ** Please Note: This note has been constructed using a voice recognition system. **

## 2024-11-10 NOTE — ASSESSMENT & PLAN NOTE
Acute on chronic, pain with associated bilateral arm pain and tingling down arms into fourth and fifth fingers which is ongoing for 3 years after injury in the [place] war, no change in tingling but pain is worse  MRI C-spine ordered  As needed pain medication and scheduled muscle relaxer  PT/OT eval.  Will likely evaluate after MRI

## 2024-11-10 NOTE — ASSESSMENT & PLAN NOTE
Secondary to acute on chronic low back pain with radiation down the legs  PT/OT eval  MRI C and L-spine ordered

## 2024-11-10 NOTE — ASSESSMENT & PLAN NOTE
Episode while in the ED of hypotension after receiving IV morphine, currently BP is stabilizing with systolic in the 140s  Patient also had syncopal episode after receiving morphine and is still drowsy, but easily alert to name and answers all questions appropriately, alert and oriented x 4  Will avoid further IV morphine doses at this time and patient requesting not to have any more IV morphine  Monitor BP

## 2024-11-10 NOTE — ASSESSMENT & PLAN NOTE
Acute on chronic bilateral lumbar back pain with radiation of pain down legs without numbness, no relief with over-the-counter pain medications  Patient did follow-up outpatient with physical therapy and PCP who did order an outpatient MRI, but due to worsening pain came to the ED with no relief after IV Solu-Medrol IV morphine so will order MRI C and L-spine while here  As needed pain medication, start Robaxin every 6 hours scheduled.  May need to consider orthopedic evaluation once MRI is done  PT/OT eval

## 2024-11-10 NOTE — PLAN OF CARE
Problem: NEUROSENSORY - ADULT  Goal: Achieves maximal functionality and self care  Description: INTERVENTIONS  - Monitor swallowing and airway patency with patient fatigue and changes in neurological status  - Encourage and assist patient to increase activity and self care.   - Encourage visually impaired, hearing impaired and aphasic patients to use assistive/communication devices  11/10/2024 0818 by Lynette De La Paz, RN  Outcome: Progressing  11/10/2024 0814 by Lynette De La Paz RN  Outcome: Progressing     Problem: HEMATOLOGIC - ADULT  Goal: Maintains hematologic stability  Description: INTERVENTIONS  - Assess for signs and symptoms of bleeding or hemorrhage  - Monitor labs  - Administer supportive blood products/factors as ordered and appropriate  11/10/2024 0818 by Lynette De La Paz RN  Outcome: Progressing  11/10/2024 0814 by Lynette De La Paz, RN  Outcome: Progressing

## 2024-11-10 NOTE — UTILIZATION REVIEW
Initial Clinical Review    Admission: Date/Time/Statement:   Admission Orders (From admission, onward)       Ordered        11/09/24 2309  Place in Observation  Once                          Orders Placed This Encounter   Procedures    Place in Observation     Standing Status:   Standing     Number of Occurrences:   1     Order Specific Question:   Level of Care     Answer:   Med Surg [16]     ED Arrival Information       Expected   -    Arrival   11/9/2024 21:14    Acuity   Urgent              Means of arrival   Wheelchair    Escorted by   Spouse    Service   Hospitalist    Admission type   Emergency              Arrival complaint   back and neck pain             Chief Complaint   Patient presents with    Back Pain     Pt reports B/L lower back pain that radiates down both legs as well as neck pain. Pt denies known injury.        Initial Presentation: 44 y.o. male to the ED from home with complaints of back pain radiating to both legs. Admitted under observation for intractable low back pain, cspine pain. H/O  chronic low back pain, chronic neck pain, PTSD, anxiety.  Acute on chronic, pain with associated bilateral arm pain and tingling down arms into fourth and fifth fingers which is ongoing for 3 years after injury in the [place] war, no change in tingling but pain is worse .  Arrives with Tenderness to palpation over bilateral lumbar paravertebral muscles and directly over lumbar spine. Tenderness to palpation over left thoracic paravertebral muscles.  In the ED given IV morphine, IV steroids.  Became hypotensive and syncopal after recieing IV morphine.  Awoke spontaneously. No injuries.       Date: 11/10   Day 2: Continue with IV solumedrol.  Start Robaxin. Check MRi c-l spine.  PT/OT eval after MRI.  Having significant hip pain.      ED Treatment-Medication Administration from 11/09/2024 2114 to 11/09/2024 2346         Date/Time Order Dose Route Action     11/09/2024 2159 morphine injection 8 mg 8 mg  Intramuscular Given     11/09/2024 2159 methylPREDNISolone sodium succinate (Solu-MEDROL) injection 125 mg 125 mg Intramuscular Given            Scheduled Medications:  acetaminophen, 975 mg, Oral, Q8H CROW  ketorolac, 15 mg, Intravenous, Q6H CROW  methocarbamol, 1,000 mg, Oral, Q6H CROW  nicotine, 1 patch, Transdermal, Daily  sertraline, 100 mg, Oral, Daily      Continuous IV Infusions:     PRN Meds:  ALPRAZolam, 0.25 mg, Oral, HS PRN  aluminum-magnesium hydroxide-simethicone, 30 mL, Oral, Q6H PRN  lidocaine, 3 patch, Topical, Daily PRN  oxyCODONE, 5 mg, Oral, Q4H PRN x1 11/10   polyethylene glycol, 17 g, Oral, Daily PRN      ED Triage Vitals [11/09/24 2122]   Temperature Pulse Respirations Blood Pressure SpO2 Pain Score   98.6 °F (37 °C) 94 18 134/93 99 % 8     Weight (last 2 days)       Date/Time Weight    11/09/24 23:49:53 88 (194.01)            Vital Signs (last 3 days)       Date/Time Temp Pulse Resp BP MAP (mmHg) SpO2 Calculated FIO2 (%) - Nasal Cannula Nasal Cannula O2 Flow Rate (L/min) O2 Device Patient Position - Orthostatic VS Margot Coma Scale Score Pain    11/10/24 0725 97.4 °F (36.3 °C) -- -- -- -- 96 % -- -- None (Room air) -- 15 7    11/10/24 07:22:56 97.4 °F (36.3 °C) 89 -- 130/88 102 94 % -- -- -- -- -- --    11/10/24 0043 -- -- -- -- -- -- -- -- -- -- -- 9    11/10/24 0028 -- -- -- -- -- -- -- -- -- -- -- 9 11/09/24 2355 -- -- -- -- -- -- -- -- -- -- -- 9 11/09/24 2350 -- -- -- -- -- -- -- -- -- -- 15 --    11/09/24 23:49:53 97.9 °F (36.6 °C) 81 -- 154/95 115 97 % -- -- -- -- -- --    11/09/24 2315 -- 67 18 120/83 96 98 % -- -- -- -- -- --    11/09/24 2300 -- 75 18 101/66 79 94 % 32 3 L/min Nasal cannula -- -- --    11/09/24 2252 -- 71 14 94/62 -- 93 % 32 3 L/min Nasal cannula Lying -- --    11/09/24 2248 -- 64 16 104/64 -- 95 % 32 3 L/min Nasal cannula Lying -- --    11/09/24 2245 -- 46 18 -- -- 94 % -- -- -- -- -- --    11/09/24 2230 -- 78 14 120/80 -- 93 % -- -- None (Room air) -- -- --     11/09/24 2122 98.6 °F (37 °C) 94 18 134/93 -- 99 % -- -- None (Room air) Sitting -- 8              Pertinent Labs/Diagnostic Test Results:     Results from last 7 days   Lab Units 11/09/24  2322   WBC Thousand/uL 13.18*   HEMOGLOBIN g/dL 15.9   HEMATOCRIT % 47.2   PLATELETS Thousands/uL 314         Results from last 7 days   Lab Units 11/09/24  2322   SODIUM mmol/L 138   POTASSIUM mmol/L 4.0   CHLORIDE mmol/L 107   CO2 mmol/L 24   ANION GAP mmol/L 7   BUN mg/dL 13   CREATININE mg/dL 1.06   EGFR ml/min/1.73sq m 84   CALCIUM mg/dL 9.1     Results from last 7 days   Lab Units 11/09/24  2322   AST U/L 18   ALT U/L 15   ALK PHOS U/L 69   TOTAL PROTEIN g/dL 6.9   ALBUMIN g/dL 4.1   TOTAL BILIRUBIN mg/dL 0.34     Results from last 7 days   Lab Units 11/09/24  2226   POC GLUCOSE mg/dl 107     Results from last 7 days   Lab Units 11/09/24  2322   GLUCOSE RANDOM mg/dL 92         Admitting Diagnosis: Neck pain [M54.2]  Back pain [M54.9]  Ambulatory dysfunction [R26.2]  Age/Sex: 44 y.o. male    Network Utilization Review Department  ATTENTION: Please call with any questions or concerns to [phone] and carefully listen to the prompts so that you are directed to the right person. All voicemails are confidential.   For Discharge needs, contact Care Management DC Support Team at [phone] opt. 2  Send all requests for admission clinical reviews, approved or denied determinations and any other requests to dedicated fax number below belonging to the campus where the patient is receiving treatment. List of dedicated fax numbers for the Facilities:  FACILITY NAME UR FAX NUMBER   ADMISSION DENIALS (Administrative/Medical Necessity) [phone]   DISCHARGE SUPPORT TEAM (NETWORK) [phone]   PARENT CHILD HEALTH (Maternity/NICU/Pediatrics) [phone]   [facility] [phone]   [facility] [phone]   Critical access hospital [phone]   [facility] [phone]   [facility] [phone]   [facility] [phone]   [facility] [phone]   [facility] [phone]   [facility] [phone]   [facility] [phone]   [facility] [phone]   [facility] [phone]

## 2024-11-10 NOTE — PLAN OF CARE
Problem: NEUROSENSORY - ADULT  Goal: Achieves maximal functionality and self care  Description: INTERVENTIONS  - Monitor swallowing and airway patency with patient fatigue and changes in neurological status  - Encourage and assist patient to increase activity and self care.   - Encourage visually impaired, hearing impaired and aphasic patients to use assistive/communication devices  Outcome: Progressing     Problem: HEMATOLOGIC - ADULT  Goal: Maintains hematologic stability  Description: INTERVENTIONS  - Assess for signs and symptoms of bleeding or hemorrhage  - Monitor labs  - Administer supportive blood products/factors as ordered and appropriate  Outcome: Progressing     Problem: MUSCULOSKELETAL - ADULT  Goal: Maintain or return mobility to safest level of function  Description: INTERVENTIONS:  - Assess patient's ability to carry out ADLs; assess patient's baseline for ADL function and identify physical deficits which impact ability to perform ADLs (bathing, care of mouth/teeth, toileting, grooming, dressing, etc.)  - Assess/evaluate cause of self-care deficits   - Assess range of motion  - Assess patient's mobility  - Assess patient's need for assistive devices and provide as appropriate  - Encourage maximum independence but intervene and supervise when necessary  - Involve family in performance of ADLs  - Assess for home care needs following discharge   - Consider OT consult to assist with ADL evaluation and planning for discharge  - Provide patient education as appropriate  Outcome: Progressing  Goal: Maintain proper alignment of affected body part  Description: INTERVENTIONS:  - Support, maintain and protect limb and body alignment  - Provide patient/ family with appropriate education  Outcome: Progressing

## 2024-11-10 NOTE — ASSESSMENT & PLAN NOTE
Acute on chronic, pain with associated bilateral arm pain and tingling down arms into fourth and fifth fingers which is ongoing for 3 years after injury in the [place] war, no change in tingling but pain is worse  MRI C-spine ordered  As needed pain medication and scheduled muscle relaxer  PT/OT eval

## 2024-11-11 ENCOUNTER — APPOINTMENT (INPATIENT)
Dept: MRI IMAGING | Facility: HOSPITAL | Age: 44
DRG: 347 | End: 2024-11-11
Payer: COMMERCIAL

## 2024-11-11 LAB
CARDIAC TROPONIN I PNL SERPL HS: 3 NG/L
ERYTHROCYTE [DISTWIDTH] IN BLOOD BY AUTOMATED COUNT: 12.9 % (ref 11.6–15.1)
HCT VFR BLD AUTO: 44.6 % (ref 36.5–49.3)
HGB BLD-MCNC: 14.6 G/DL (ref 12–17)
MCH RBC QN AUTO: 29.4 PG (ref 26.8–34.3)
MCHC RBC AUTO-ENTMCNC: 32.7 G/DL (ref 31.4–37.4)
MCV RBC AUTO: 90 FL (ref 82–98)
PLATELET # BLD AUTO: 289 THOUSANDS/UL (ref 149–390)
PMV BLD AUTO: 10.7 FL (ref 8.9–12.7)
RBC # BLD AUTO: 4.96 MILLION/UL (ref 3.88–5.62)
WBC # BLD AUTO: 21.93 THOUSAND/UL (ref 4.31–10.16)

## 2024-11-11 PROCEDURE — 85027 COMPLETE CBC AUTOMATED: CPT | Performed by: FAMILY MEDICINE

## 2024-11-11 PROCEDURE — 72148 MRI LUMBAR SPINE W/O DYE: CPT

## 2024-11-11 PROCEDURE — 99222 1ST HOSP IP/OBS MODERATE 55: CPT | Performed by: ORTHOPAEDIC SURGERY

## 2024-11-11 PROCEDURE — 72141 MRI NECK SPINE W/O DYE: CPT

## 2024-11-11 PROCEDURE — 84484 ASSAY OF TROPONIN QUANT: CPT

## 2024-11-11 PROCEDURE — 99232 SBSQ HOSP IP/OBS MODERATE 35: CPT | Performed by: FAMILY MEDICINE

## 2024-11-11 RX ORDER — TIZANIDINE 2 MG/1
4 TABLET ORAL 3 TIMES DAILY
Status: DISCONTINUED | OUTPATIENT
Start: 2024-11-11 | End: 2024-11-12 | Stop reason: HOSPADM

## 2024-11-11 RX ORDER — OXYCODONE HYDROCHLORIDE 10 MG/1
10 TABLET ORAL EVERY 4 HOURS PRN
Status: DISCONTINUED | OUTPATIENT
Start: 2024-11-11 | End: 2024-11-12 | Stop reason: HOSPADM

## 2024-11-11 RX ORDER — HYDROMORPHONE HCL/PF 1 MG/ML
0.5 SYRINGE (ML) INJECTION EVERY 4 HOURS PRN
Status: DISCONTINUED | OUTPATIENT
Start: 2024-11-11 | End: 2024-11-12 | Stop reason: HOSPADM

## 2024-11-11 RX ORDER — ENOXAPARIN SODIUM 100 MG/ML
40 INJECTION SUBCUTANEOUS
Status: DISCONTINUED | OUTPATIENT
Start: 2024-11-11 | End: 2024-11-12 | Stop reason: HOSPADM

## 2024-11-11 RX ORDER — OXYCODONE HYDROCHLORIDE 5 MG/1
5 TABLET ORAL EVERY 4 HOURS PRN
Status: DISCONTINUED | OUTPATIENT
Start: 2024-11-11 | End: 2024-11-12 | Stop reason: HOSPADM

## 2024-11-11 RX ADMIN — ACETAMINOPHEN 975 MG: 325 TABLET, FILM COATED ORAL at 14:15

## 2024-11-11 RX ADMIN — OXYCODONE HYDROCHLORIDE 5 MG: 5 TABLET ORAL at 08:47

## 2024-11-11 RX ADMIN — METHOCARBAMOL TABLETS 1000 MG: 500 TABLET, COATED ORAL at 06:10

## 2024-11-11 RX ADMIN — SERTRALINE 100 MG: 100 TABLET, FILM COATED ORAL at 08:33

## 2024-11-11 RX ADMIN — LIDOCAINE 1 PATCH: 50 PATCH CUTANEOUS at 08:33

## 2024-11-11 RX ADMIN — TIZANIDINE 4 MG: 2 TABLET ORAL at 21:38

## 2024-11-11 RX ADMIN — TIZANIDINE 4 MG: 2 TABLET ORAL at 15:00

## 2024-11-11 RX ADMIN — ENOXAPARIN SODIUM 40 MG: 40 INJECTION SUBCUTANEOUS at 11:58

## 2024-11-11 RX ADMIN — METHOCARBAMOL TABLETS 1000 MG: 500 TABLET, COATED ORAL at 00:49

## 2024-11-11 RX ADMIN — OXYCODONE HYDROCHLORIDE 10 MG: 10 TABLET ORAL at 15:00

## 2024-11-11 RX ADMIN — TIZANIDINE 4 MG: 2 TABLET ORAL at 10:07

## 2024-11-11 RX ADMIN — HYDROMORPHONE HYDROCHLORIDE 0.5 MG: 1 INJECTION, SOLUTION INTRAMUSCULAR; INTRAVENOUS; SUBCUTANEOUS at 12:03

## 2024-11-11 RX ADMIN — ACETAMINOPHEN 975 MG: 325 TABLET, FILM COATED ORAL at 21:38

## 2024-11-11 RX ADMIN — OXYCODONE HYDROCHLORIDE 10 MG: 10 TABLET ORAL at 23:56

## 2024-11-11 NOTE — PLAN OF CARE
Problem: MUSCULOSKELETAL - ADULT  Goal: Maintain or return mobility to safest level of function  Description: INTERVENTIONS:  - Assess patient's ability to carry out ADLs; assess patient's baseline for ADL function and identify physical deficits which impact ability to perform ADLs (bathing, care of mouth/teeth, toileting, grooming, dressing, etc.)  - Assess/evaluate cause of self-care deficits   - Assess range of motion  - Assess patient's mobility  - Assess patient's need for assistive devices and provide as appropriate  - Encourage maximum independence but intervene and supervise when necessary  - Involve family in performance of ADLs  - Assess for home care needs following discharge   - Consider OT consult to assist with ADL evaluation and planning for discharge  - Provide patient education as appropriate  Outcome: Progressing  Goal: Maintain proper alignment of affected body part  Description: INTERVENTIONS:  - Support, maintain and protect limb and body alignment  - Provide patient/ family with appropriate education  Outcome: Progressing     Problem: HEMATOLOGIC - ADULT  Goal: Maintains hematologic stability  Description: INTERVENTIONS  - Assess for signs and symptoms of bleeding or hemorrhage  - Monitor labs  - Administer supportive blood products/factors as ordered and appropriate  Outcome: Progressing     Problem: NEUROSENSORY - ADULT  Goal: Achieves maximal functionality and self care  Description: INTERVENTIONS  - Monitor swallowing and airway patency with patient fatigue and changes in neurological status  - Encourage and assist patient to increase activity and self care.   - Encourage visually impaired, hearing impaired and aphasic patients to use assistive/communication devices  Outcome: Progressing

## 2024-11-11 NOTE — UTILIZATION REVIEW
NOTIFICATION OF OBSERVATION ADMISSION   AUTHORIZATION REQUEST   SERVICING FACILITY:   [place] [address]  Tax ID: [id number]  NPI: [id number] ATTENDING PROVIDER:  Attending Name and NPI#: Tylor Kennedy Md [[id number]]  Address: [address] [place] [address]  Phone: [phone]     ADMISSION INFORMATION:  Place of Service: On [facility]  Place of Service Code: 22 CPT Code:   Admitting Diagnosis Code/Description:  Neck pain [M54.2]  Back pain [M54.9]  Ambulatory dysfunction [R26.2]  Observation Admission Date/Time: 11/09/2024 2309  Discharge Date/Time: No discharge date for patient encounter.     UTILIZATION REVIEW CONTACT:  Isabel Austin Utilization   Network Utilization Review Department  Phone: [phone]  Fax [phone]  Email: [email or web address]  Contact for approvals/pending authorizations, clinical reviews, and discharge.     PHYSICIAN ADVISORY SERVICES:  Medical Necessity Denial & Xzwh-mm-Ruqt Review  Phone: [phone]  Fax: [phone]  Email: [email or web address]     DISCHARGE SUPPORT TEAM:  For Patients Discharge Needs & Updates  Phone: [phone] opt. 2 Fax: [phone]  Email: [email or web address]

## 2024-11-11 NOTE — ASSESSMENT & PLAN NOTE
Secondary to acute on chronic low back pain with radiation down the legs  PT/OT eval  MRI C and L-spine ordered given significant pain.  Patient is even having difficulty moving in bed.  This still persistent.  Medications adjusted today.

## 2024-11-11 NOTE — UTILIZATION REVIEW
Continued Stay Review    Date: 11/11   and 11/12 11/9 2309 converted to IP On 11/11 1105 for intractable low back pain , adjustment of pain meds , PT OT eval     Start   Ordered   11/11/24 1105  INPATIENT ADMISSION  Once        Transfer Service: Hospitalist   Question Answer Comment   Level of Care Med Surg    Estimated length of stay More than 2 Midnights    Certification I certify that inpatient services are medically necessary for this patient for a duration of greater than two midnights. See H&P and MD Progress Notes for additional information about the patient's course of treatment.        11/11/24 1104 11/09/24 2309  Place in Observation  (ED Bridging Orders Panel)  Once        Transfer Service: Hospitalist   Question: Level of Care Answer: Med Surg    11/09/24 2309     Current Patient Class: Inpatient  Current Level of Care: MS    HPI:44 y.o. male initially admitted on  11/9 as OBS , IP 11/11    Assessment/Plan:     11/11 IM Note   Change the Robaxin to tizanidine. Multimodal pain regimen. Did add a low-dose of Dilaudid for breakthrough pain. Did give the patient 1 dose of dexamethasone yesterday. MRI C and L-spine ordered given significant pain. Patient is even having difficulty moving in bed. This still persistent. Medications adjusted today. PT OT eval . Hypotension has resolved .     11/11 Ortho Consult   Intractable low back . Xray lumbar spine without osseous abnormalities.  Recommend lumbar MRI . PT OT eval . Pain control .     11/12 IM Note   MRI was done and notes some impingement of the cervical nerves as noted above. Today agreeable for discharge advised follow-up with outpatient.     11/12 Ortho Note   Dispo: Recommend conservative management in the form of analgesics for pain control and PT/OT. Upon discharge patient should follow-up outpatient with pain management to discuss the role of injections. There is no immediate orthopedic intervention planned while inpatient.      Medications:   Scheduled Medications:  acetaminophen, 975 mg, Oral, Q8H CROW  enoxaparin, 40 mg, Subcutaneous, Q24H CROW  lidocaine, 1 patch, Topical, Daily  nicotine, 1 patch, Transdermal, Daily  sertraline, 100 mg, Oral, Daily  tiZANidine, 4 mg, Oral, TID      Continuous IV Infusions:     PRN Meds:  ALPRAZolam, 0.25 mg, Oral, HS PRN  aluminum-magnesium hydroxide-simethicone, 30 mL, Oral, Q6H PRN  HYDROmorphone, 0.5 mg, Intravenous, Q4H PRN 11/11 x1  lidocaine, 3 patch, Topical, Daily PRN  oxyCODONE, 10 mg, Oral, Q4H PRN  oxyCODONE, 5 mg, Oral, Q4H PRN  polyethylene glycol, 17 g, Oral, Daily PRN      Discharge Plan: TBD    Vital Signs (last 3 days)       Date/Time Temp Pulse Resp BP MAP (mmHg) SpO2 Calculated FIO2 (%) - Nasal Cannula Nasal Cannula O2 Flow Rate (L/min) O2 Device Patient Position - Orthostatic VS [place] Coma Scale Score Pain    11/12/24 0959 -- -- -- -- -- 98 % -- -- None (Room air) -- 15 --    11/12/24 0700 97.6 °F (36.4 °C) 63 18 124/89 101 98 % -- -- None (Room air) Lying -- No Pain    11/12/24 06:53:04 97.6 °F (36.4 °C) 71 18 124/89 101 99 % -- -- None (Room air) Lying -- --    11/12/24 0552 -- -- -- -- -- -- -- -- -- -- -- 4 11/11/24 2356 -- -- -- -- -- -- -- -- -- -- -- 9    11/11/24 22:22:36 98.2 °F (36.8 °C) 68 18 124/89 101 96 % -- -- -- -- -- --    11/11/24 2138 -- -- -- -- -- -- -- -- -- -- -- 4 11/11/24 2100 -- -- -- -- -- 96 % -- -- None (Room air) -- 15 6    11/11/24 1500 -- -- -- -- -- -- -- -- -- -- -- 8 11/11/24 14:27:57 97 °F (36.1 °C) 75 18 104/59 74 95 % -- -- None (Room air) Lying -- --    11/11/24 1415 -- -- -- -- -- -- -- -- -- -- -- 8 11/11/24 1203 -- -- -- -- -- -- -- -- -- -- -- 8 11/11/24 0847 -- -- -- -- -- -- -- -- -- -- -- 8 11/11/24 0805 -- -- -- -- -- 96 % -- -- None (Room air) -- 15 7    11/11/24 07:43:07 97.8 °F (36.6 °C) 83 18 123/77 92 96 % -- -- None (Room air) Lying -- --    11/10/24 22:42:57 97.3 °F (36.3 °C) 84 16 133/80 98 95 % -- -- -- --  -- --    11/10/24 2214 -- -- -- -- -- -- -- -- -- -- -- 8    11/10/24 2100 -- -- -- -- -- 96 % -- -- None (Room air) -- 15 8    11/10/24 19:48:16 97.4 °F (36.3 °C) 84 -- -- -- 95 % -- -- -- -- -- --    11/10/24 1716 -- -- -- -- -- -- -- -- -- -- -- 8    11/10/24 15:04:44 98.2 °F (36.8 °C) 97 -- 133/86 102 94 % -- -- -- -- -- --    11/10/24 1501 -- -- -- -- -- -- -- -- -- -- -- 5    11/10/24 1500 -- -- -- -- -- -- -- -- -- -- -- 4    11/10/24 0931 -- -- -- -- -- -- -- -- -- -- -- 7    11/10/24 0930 -- -- -- -- -- -- -- -- -- -- -- 7    11/10/24 0725 97.4 °F (36.3 °C) -- -- -- -- 96 % -- -- None (Room air) -- 15 7    11/10/24 07:22:56 97.4 °F (36.3 °C) 89 -- 130/88 102 94 % -- -- -- -- -- --    11/10/24 0043 -- -- -- -- -- -- -- -- -- -- -- 9    11/10/24 0028 -- -- -- -- -- -- -- -- -- -- -- 9 11/09/24 2355 -- -- -- -- -- -- -- -- -- -- -- 9 11/09/24 2350 -- -- -- -- -- -- -- -- -- -- 15 -- 11/09/24 23:49:53 97.9 °F (36.6 °C) 81 -- 154/95 115 97 % -- -- -- -- -- --    11/09/24 2315 -- 67 18 120/83 96 98 % -- -- -- -- -- --    11/09/24 2300 -- 75 18 101/66 79 94 % 32 3 L/min Nasal cannula -- -- --    11/09/24 2252 -- 71 14 94/62 -- 93 % 32 3 L/min Nasal cannula Lying -- --    11/09/24 2248 -- 64 16 104/64 -- 95 % 32 3 L/min Nasal cannula Lying -- --    11/09/24 2245 -- 46 18 -- -- 94 % -- -- -- -- -- --    11/09/24 2230 -- 78 14 120/80 -- 93 % -- -- None (Room air) -- -- --    11/09/24 2122 98.6 °F (37 °C) 94 18 134/93 -- 99 % -- -- None (Room air) Sitting -- 8          Weight (last 2 days)       None            Pertinent Labs/Diagnostic Results:   Radiology:  MRI cervical spine wo contrast   Final Interpretation by Ezra Dueñas MD (11/12 0958)      Multilevel cervical degenerative disc disease as detailed with moderate central canal narrowing at the C4-5 level. Moderate bilateral C4-5, bilateral C5-6, and right C6-7 neural foraminal narrowing.      The cervical cord appears normal in caliber and  signal with no evidence of focal impingement.               Resident: DERICK TEMPLE I, the attending radiologist, have reviewed the images and agree with the final report above.      Workstation performed: [id number]         MRI lumbar spine wo contrast   Final Interpretation by Ezra Dueñas MD (11/12 0951)      Spondylosis at the L4-L5 disc space with left foraminal extrusion abutting the descending L5 nerve root with encroachment-correlate with radiculopathy symptoms.         Resident: DERICK TEMPLE I, the attending radiologist, have reviewed the images and agree with the final report above.      Workstation performed: [id number]         XR spine lumbar minimum 4 views non injury   Final Interpretation by Nasir Duffy MD (11/11 0703)      No acute osseous abnormality.         Computerized Assisted Algorithm (CAA) may have been used to analyze all applicable images.         Workstation performed: [id number]           Cardiology:  No orders to display     GI:  No orders to display           Results from last 7 days   Lab Units 11/12/24  0556 11/11/24  0435 11/09/24  2322   WBC Thousand/uL 11.94* 21.93* 13.18*   HEMOGLOBIN g/dL 14.2 14.6 15.9   HEMATOCRIT % 44.0 44.6 47.2   PLATELETS Thousands/uL 228 289 314         Results from last 7 days   Lab Units 11/09/24  2322   SODIUM mmol/L 138   POTASSIUM mmol/L 4.0   CHLORIDE mmol/L 107   CO2 mmol/L 24   ANION GAP mmol/L 7   BUN mg/dL 13   CREATININE mg/dL 1.06   EGFR ml/min/1.73sq m 84   CALCIUM mg/dL 9.1     Results from last 7 days   Lab Units 11/09/24  2322   AST U/L 18   ALT U/L 15   ALK PHOS U/L 69   TOTAL PROTEIN g/dL 6.9   ALBUMIN g/dL 4.1   TOTAL BILIRUBIN mg/dL 0.34     Results from last 7 days   Lab Units 11/09/24  2226   POC GLUCOSE mg/dl 107     Results from last 7 days   Lab Units 11/09/24  2322   GLUCOSE RANDOM mg/dL 92       Results from last 7 days   Lab Units 11/11/24  0528   HS TNI 0HR ng/L 3         Network  Utilization Review Department  ATTENTION: Please call with any questions or concerns to [phone] and carefully listen to the prompts so that you are directed to the right person. All voicemails are confidential.   For Discharge needs, contact Care Management DC Support Team at [phone] opt. [phone]  Send all requests for admission clinical reviews, approved or denied determinations and any other requests to dedicated fax number below belonging to the campus where the patient is receiving treatment. List of dedicated fax numbers for the Facilities:  FACILITY NAME UR FAX NUMBER   ADMISSION DENIALS (Administrative/Medical Necessity) [phone]   DISCHARGE SUPPORT TEAM (NETWORK) [phone]   PARENT CHILD HEALTH (Maternity/NICU/Pediatrics) [phone]   [facility] [phone]   [facility] [phone]   Replaced by [facility] [phone]   [facility] [phone]   [facility] [phone]   [facility] [phone]   [facility] [phone]   [facility] [phone]   [facility] [phone]   [facility] [phone]   [facility] [phone]   [facility] [phone]

## 2024-11-11 NOTE — CASE MANAGEMENT
Case Management Assessment & Discharge Planning Note    Patient name Garth Cai  Location /-01 MRN [id number]  : 1980 Date 2024       Current Admission Date: 2024  Current Admission Diagnosis:Intractable low back pain   Patient Active Problem List    Diagnosis Date Noted Date Diagnosed    Ambulatory dysfunction 2024     Intractable low back pain 2024     Cervical spine pain 2024     Hypotension 2024     Acute left-sided low back pain without sciatica 2023       LOS (days): 0  Geometric Mean LOS (GMLOS) (days):   Days to GMLOS:     OBJECTIVE:    Risk of Unplanned Readmission Score: 8.65         Current admission status: Inpatient       Preferred Pharmacy:   [id number] - [place] - [address]  [address]  [place] [id number]  Phone: [phone] Fax: [phone]    Primary Care Provider: No primary care provider on file.    Primary Insurance: STEVEN ANNE  Secondary Insurance:     ASSESSMENT:  Active Health Care Proxies       NIKITA CAI Health Care Representative - Spouse   Primary Phone: [phone] (Mobile)                 Advance Directives  Does patient have a Health Care POA?: No  Does patient currently have a Health Care decision maker?: Yes, please see Health Care Proxy section  Does patient have Advance Directives?: No  Was patient offered paperwork?: Yes  Primary Contact: NIKITA (Spouse)  [phone]         Readmission Root Cause  30 Day Readmission: No    Patient Information  Admitted from:: Home  Mental Status: Alert  During Assessment patient was accompanied by: Spouse  Assessment information provided by:: Patient, Spouse  Primary Caregiver: Self  Support Systems: Spouse/significant other  County of Residence: [place]  What city do you live in?: [place]  Home entry access options. Select all that apply.: No steps to enter home  Type of Current Residence: 2 story home  Upon  entering residence, is there a bedroom on the main floor (no further steps)?: No  A bedroom is located on the following floor levels of residence (select all that apply):: 2nd Floor  Upon entering residence, is there a bathroom on the main floor (no further steps)?: No  Indicate which floors of current residence have a bathroom (select all the apply):: 2nd Floor  Number of steps to 2nd floor from main floor: One Flight  Living Arrangements: Lives w/ Spouse/significant other, Lives w/ Children  Is patient a ?: No    Activities of Daily Living Prior to Admission  Functional Status: Independent  Completes ADLs independently?: Yes  Ambulates independently?: Yes  Does patient use assisted devices?: No  Does patient currently own DME?: No  Does patient have a history of Outpatient Therapy (PT/OT)?: Yes  Does the patient have a history of Short-Term Rehab?: No  Does patient have a history of HHC?: No  Does patient currently have HHC?: No         Patient Information Continued  Income Source: Employed  Does patient have prescription coverage?: Yes  Does patient receive dialysis treatments?: No  Does patient have a history of substance abuse?: No  Does patient have a history of Mental Health Diagnosis?: No         Means of Transportation  Means of Transport to Appts:: Drives Self          DISCHARGE DETAILS:    Discharge planning discussed with:: patient and patient's spouse at bedside  Freedom of Choice: Yes  Comments - Freedom of Choice: CM maintained freedom of choice as it pertains to discharge planning. Patient reports plan to return home at discharge. Patient and wife both report that they would not be interested in patient going to a rehab facility at discharge, they report that patient is participating in OP PT and plan to continue at discharge. [facility]  CM contacted family/caregiver?: Yes  Were Treatment Team discharge recommendations reviewed with patient/caregiver?: Yes  Did  patient/caregiver verbalize understanding of patient care needs?: Yes  Were patient/caregiver advised of the risks associated with not following Treatment Team discharge recommendations?: Yes    Contacts  Patient Contacts: NIKITA (Spouse)  [phone]  Relationship to Patient:: Family  Contact Method: In Person  Reason/Outcome: Continuity of Care, Emergency Contact, Discharge Planning    Requested Home Health Care         Is the patient interested in HHC at discharge?: No    DME Referral Provided  Referral made for DME?: No    Other Referral/Resources/Interventions Provided:  Interventions: Declines resources    Would you like to participate in our [facility] service program?  : No - Declined    Treatment Team Recommendation: Other (no recs at this time)  Discharge Destination Plan:: Home  Transport at Discharge : Family
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
## 2024-11-11 NOTE — ASSESSMENT & PLAN NOTE
Acute on chronic bilateral lumbar back pain with radiation of pain down legs without numbness, no relief with over-the-counter pain medications  Patient did follow-up outpatient with physical therapy and PCP who did order an outpatient MRI, but due to worsening pain came to the ED with no relief after IV Solu-Medrol IV morphine so will order MRI C and L-spine while here  Change the Robaxin to tizanidine.  Multimodal pain regimen.  Did add a low-dose of Dilaudid for breakthrough pain.  Did give the patient 1 dose of dexamethasone yesterday.  Does have a pretty significant leukocytosis likely related to that but we will check another CBC tomorrow.  Denies any saddle anesthesia or incontinence  Reached out to orthopedics to make them aware of consultation
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
## 2024-11-11 NOTE — PHYSICAL THERAPY NOTE
Physical Therapy Cancellation Note    PT order received. Chart review performed. At this time, PT evaluation cancelled as patient is pending MRI of cervical and lumbar spine. PT to continue to follow and evaluate when appropriate.      11/11/24 0700   PT Last Visit   PT Visit Date 11/11/24   Note Type   Note type Evaluation;Cancelled Session   Cancel Reasons Medical status       Bibi Morrison, PT, DPT

## 2024-11-12 VITALS
OXYGEN SATURATION: 98 % | WEIGHT: 194 LBS | HEIGHT: 69 IN | HEART RATE: 63 BPM | DIASTOLIC BLOOD PRESSURE: 89 MMHG | TEMPERATURE: 97.6 F | RESPIRATION RATE: 18 BRPM | BODY MASS INDEX: 28.73 KG/M2 | SYSTOLIC BLOOD PRESSURE: 124 MMHG

## 2024-11-12 PROBLEM — I95.9 HYPOTENSION: Status: RESOLVED | Noted: 2024-11-09 | Resolved: 2024-11-12

## 2024-11-12 LAB
ERYTHROCYTE [DISTWIDTH] IN BLOOD BY AUTOMATED COUNT: 13 % (ref 11.6–15.1)
HCT VFR BLD AUTO: 44 % (ref 36.5–49.3)
HGB BLD-MCNC: 14.2 G/DL (ref 12–17)
MCH RBC QN AUTO: 29.6 PG (ref 26.8–34.3)
MCHC RBC AUTO-ENTMCNC: 32.3 G/DL (ref 31.4–37.4)
MCV RBC AUTO: 92 FL (ref 82–98)
PLATELET # BLD AUTO: 228 THOUSANDS/UL (ref 149–390)
PMV BLD AUTO: 10 FL (ref 8.9–12.7)
RBC # BLD AUTO: 4.8 MILLION/UL (ref 3.88–5.62)
WBC # BLD AUTO: 11.94 THOUSAND/UL (ref 4.31–10.16)

## 2024-11-12 PROCEDURE — 99239 HOSP IP/OBS DSCHRG MGMT >30: CPT

## 2024-11-12 PROCEDURE — 85027 COMPLETE CBC AUTOMATED: CPT | Performed by: FAMILY MEDICINE

## 2024-11-12 PROCEDURE — 99231 SBSQ HOSP IP/OBS SF/LOW 25: CPT

## 2024-11-12 RX ORDER — METHOCARBAMOL 500 MG/1
500 TABLET, FILM COATED ORAL 4 TIMES DAILY
Qty: 80 TABLET | Refills: 0 | Status: SHIPPED | OUTPATIENT
Start: 2024-11-12 | End: 2024-11-12

## 2024-11-12 RX ORDER — KETOROLAC TROMETHAMINE 10 MG/1
10 TABLET, FILM COATED ORAL EVERY 6 HOURS
Qty: 28 TABLET | Refills: 0 | Status: SHIPPED | OUTPATIENT
Start: 2024-11-12 | End: 2024-11-19

## 2024-11-12 RX ORDER — METHOCARBAMOL 500 MG/1
500 TABLET, FILM COATED ORAL 3 TIMES DAILY PRN
Qty: 30 TABLET | Refills: 0 | Status: SHIPPED | OUTPATIENT
Start: 2024-11-12

## 2024-11-12 RX ADMIN — ACETAMINOPHEN 975 MG: 325 TABLET, FILM COATED ORAL at 05:52

## 2024-11-12 RX ADMIN — ENOXAPARIN SODIUM 40 MG: 40 INJECTION SUBCUTANEOUS at 09:46

## 2024-11-12 RX ADMIN — TIZANIDINE 4 MG: 2 TABLET ORAL at 09:46

## 2024-11-12 RX ADMIN — LIDOCAINE 1 PATCH: 50 PATCH CUTANEOUS at 09:49

## 2024-11-12 RX ADMIN — SERTRALINE 100 MG: 100 TABLET, FILM COATED ORAL at 09:46

## 2024-11-12 NOTE — PLAN OF CARE
Problem: NEUROSENSORY - ADULT  Goal: Achieves maximal functionality and self care  Description: INTERVENTIONS  - Monitor swallowing and airway patency with patient fatigue and changes in neurological status  - Encourage and assist patient to increase activity and self care.   - Encourage visually impaired, hearing impaired and aphasic patients to use assistive/communication devices  11/12/2024 1420 by Estelita Corado RN  Outcome: Progressing  11/12/2024 1420 by Estelita Corado RN  Outcome: Progressing     Problem: HEMATOLOGIC - ADULT  Goal: Maintains hematologic stability  Description: INTERVENTIONS  - Assess for signs and symptoms of bleeding or hemorrhage  - Monitor labs  - Administer supportive blood products/factors as ordered and appropriate  11/12/2024 1420 by Estelita Corado RN  Outcome: Progressing  11/12/2024 1420 by Estelita Corado RN  Outcome: Progressing     Problem: MUSCULOSKELETAL - ADULT  Goal: Maintain or return mobility to safest level of function  Description: INTERVENTIONS:  - Assess patient's ability to carry out ADLs; assess patient's baseline for ADL function and identify physical deficits which impact ability to perform ADLs (bathing, care of mouth/teeth, toileting, grooming, dressing, etc.)  - Assess/evaluate cause of self-care deficits   - Assess range of motion  - Assess patient's mobility  - Assess patient's need for assistive devices and provide as appropriate  - Encourage maximum independence but intervene and supervise when necessary  - Involve family in performance of ADLs  - Assess for home care needs following discharge   - Consider OT consult to assist with ADL evaluation and planning for discharge  - Provide patient education as appropriate  11/12/2024 1420 by Estelita Corado RN  Outcome: Progressing  11/12/2024 1420 by Estelita Corado RN  Outcome: Progressing  Goal: Maintain proper alignment of affected body part  Description: INTERVENTIONS:  - Support, maintain and protect limb and body  alignment  - Provide patient/ family with appropriate education  11/12/2024 1420 by Estelita Corado, RN  Outcome: Progressing  11/12/2024 1420 by Estelita Corado RN  Outcome: Progressing     Problem: GASTROINTESTINAL - ADULT  Goal: Maintains or returns to baseline bowel function  Description: INTERVENTIONS:  - Assess bowel function  - Encourage oral fluids to ensure adequate hydration  - Administer IV fluids if ordered to ensure adequate hydration  - Administer ordered medications as needed  - Encourage mobilization and activity  - Consider nutritional services referral to assist patient with adequate nutrition and appropriate food choices  Outcome: Progressing  Goal: Maintains adequate nutritional intake  Description: INTERVENTIONS:  - Monitor percentage of each meal consumed  - Identify factors contributing to decreased intake, treat as appropriate  - Assist with meals as needed  - Monitor I&O, weight, and lab values if indicated  - Obtain nutrition services referral as needed  Outcome: Progressing     Problem: GENITOURINARY - ADULT  Goal: Absence of urinary retention  Description: INTERVENTIONS:  - Assess patient’s ability to void and empty bladder  - Monitor I/O  - Bladder scan as needed  - Discuss with physician/AP medications to alleviate retention as needed  - Discuss catheterization for long term situations as appropriate  Outcome: Progressing

## 2024-11-12 NOTE — ASSESSMENT & PLAN NOTE
- Acute on chronic low back pain with radiation into the bilateral lower extremities  - Xray lumbar spine without osseous abnormalities  - Recommend lumbar MRI which was ordered by [facility]  - WBAT bilateral lower extremities  - PT/OT  - Pain control per primary

## 2024-11-12 NOTE — PHYSICAL THERAPY NOTE
Physical Therapy Cancellation     Patient's Name: Garth Lo    Admitting Diagnosis  Neck pain [M54.2]  Back pain [M54.9]  Ambulatory dysfunction [R26.2]    Problem List  Patient Active Problem List   Diagnosis    Acute left-sided low back pain without sciatica    Ambulatory dysfunction    Intractable low back pain    Cervical spine pain    Hypotension       Past Medical History  History reviewed. No pertinent past medical history.    Past Surgical History  History reviewed. No pertinent surgical history.       11/12/24 0844   PT Last Visit   PT Visit Date 11/12/24   Note Type   Note type Cancelled Session   Cancel Reasons Medical status   Additional Comments PT order received. Chart review performed. At this time, PT evaluation cancelled due to as pt pending formal read on spine MRIs. PT will continue to follow and evaluate as patient is medically appropriate for skilled PT interventions.           Kim Reilly, PT

## 2024-11-12 NOTE — ASSESSMENT & PLAN NOTE
- Acute on chronic low back pain with radiation into the bilateral lower extremities  WBAT bilateral lower extremities  PT/OT  Analgesics per primary team  DVT ppx per primary team  MRI of lumbar spine was reviewed with Dr. Richmond and with the patient today.   Dispo: Recommend conservative management in the form of analgesics for pain control and PT/OT. Upon discharge patient should follow-up outpatient with pain management to discuss the role of injections. There is no immediate orthopedic intervention planned while inpatient. Ortho signing off. If there are any additional questions or concerns, please reach out.

## 2024-11-12 NOTE — ASSESSMENT & PLAN NOTE
- Acute on chronic cervical spine pain  - MRI c-spine ordered  - WBAT bilateral upper extremities  - PT/OT  - Pain control per primary

## 2024-11-12 NOTE — ASSESSMENT & PLAN NOTE
Acute on chronic bilateral lumbar back pain with radiation of pain down legs without numbness, no relief with over-the-counter pain medications  Patient had MRI and noted to have nerve compression on C6-C7, C4 and C5 on the left.  Does not need surgery.  Patient does not want oxycodone. Will discontinue  Will start ketorolac 10 mg every 6 hours for the next 7 days for anti-inflammatory  Comprehensive spine referral  Physical therapy referral  Used a [place] portable  needs to be used.  I was able to use this to discussed with the patient.  He is agreeable with the plan.  Follow-up with PCP as outpatient

## 2024-11-12 NOTE — ASSESSMENT & PLAN NOTE
- Acute on chronic cervical spine pain  WBAT bilateral upper extremities  PT/OT  Pain control per primary  MRI of cervical spine was reviewed with Dr. Richmond and with the patient today.   Dispo: See dispo plan above. Recommend conservative management involving physical therapy and pain management. No interventions planned at this time.

## 2024-11-12 NOTE — ASSESSMENT & PLAN NOTE
Secondary to acute on chronic low back pain with radiation down the legs  Agreeable for discharge and PT OT as outpatient

## 2024-11-12 NOTE — DISCHARGE SUMMARY
Discharge Summary - Hospitalist   Name: Garth Lo 44 y.o. male I MRN: [id number]  Unit/Bed#: -01 I Date of Admission: 11/9/2024   Date of Service: 11/12/2024 I Hospital Day: 1     Assessment & Plan  Intractable low back pain  Acute on chronic bilateral lumbar back pain with radiation of pain down legs without numbness, no relief with over-the-counter pain medications  Patient had MRI and noted to have nerve compression on C6-C7, C4 and C5 on the left.  Does not need surgery.  Patient does not want oxycodone. Will discontinue  Will start ketorolac 10 mg every 6 hours for the next 7 days for anti-inflammatory  Comprehensive spine referral  Physical therapy referral  Used a [employer] portable  needs to be used.  I was able to use this to discussed with the patient.  He is agreeable with the plan.  Follow-up with PCP as outpatient  Ambulatory dysfunction  Secondary to acute on chronic low back pain with radiation down the legs  Agreeable for discharge and PT OT as outpatient  Cervical spine pain  Acute on chronic, pain with associated bilateral arm pain and tingling down arms into fourth and fifth fingers which is ongoing for 3 years after injury in the [place] war, no change in tingling but pain is worse  MRI C-spine ordered  As needed pain medication and scheduled muscle relaxer  PT/OT eval.  Will likely evaluate after MRI  Hypotension (Resolved: 11/12/2024)  Episode while in the ED of hypotension after receiving IV morphine 6 mg.  Patient was in severe pain when he went to the emergency department  Patient is very upset because he was given this high dose of morphine.  I did explain to him that he was in a lot of pain during that time from chart review and they thought that it was appropriate.  Currently stabilized and no longer low blood pressure.     Medical Problems       Resolved Problems  Date Reviewed: 11/11/2024            Resolved    Hypotension 11/12/2024     Resolved by  Abilio  Lee Nelson MD        Discharging Physician / Practitioner: Abilio Nelson MD  PCP: No primary care provider on file.  Admission Date:   Admission Orders (From admission, onward)       Ordered        11/11/24 1104  INPATIENT ADMISSION  Once            11/09/24 2309  Place in Observation  Once                          Discharge Date: 11/12/24    Consultations During Hospital Stay:  None    Procedures Performed:   none    Significant Findings / Test Results:   MRI cervical spine wo contrast    Result Date: 11/12/2024  Impression: Multilevel cervical degenerative disc disease as detailed with moderate central canal narrowing at the C4-5 level. Moderate bilateral C4-5, bilateral C5-6, and right C6-7 neural foraminal narrowing. The cervical cord appears normal in caliber and signal with no evidence of focal impingement. Resident: DERICK TEMPLE I, the attending radiologist, have reviewed the images and agree with the final report above. Workstation performed: [id number]     MRI lumbar spine wo contrast    Result Date: 11/12/2024  Impression: Spondylosis at the L4-L5 disc space with left foraminal extrusion abutting the descending L5 nerve root with encroachment-correlate with radiculopathy symptoms. Resident: DERICK TEMPLE I, the attending radiologist, have reviewed the images and agree with the final report above. Workstation performed: [id number]     XR spine lumbar minimum 4 views non injury    Result Date: 11/11/2024  Impression: No acute osseous abnormality. Computerized Assisted Algorithm (CAA) may have been used to analyze all applicable images. Workstation performed: [id number]       No Chest XR results available for this patient.       Incidental Findings:   none       Test Results Pending at Discharge (will require follow up):   none     Outpatient Tests Requested:  none    Complications:  none    Reason for Admission: none    Hospital Course:   Garth Lo is a 44 y.o. male patient who  "originally presented to the hospital on 11/9/2024.  Patient comes in with severe back pain and was in severe pain in the emergency department.  The emergency did give morphine 6 mg IV and patient became hypotensive as a result.  Ortho is PDX was consulted for the back pain and deemed nonsurgical.  MRI was done and notes some impingement of the cervical nerves as noted above.  Today agreeable for discharge advised follow-up with outpatient.        Please see above list of diagnoses and related plan for additional information.     Condition at Discharge: good    Discharge Day Visit / Exam:   Subjective:  Patient does not report and headaches, shortness of breath, chest pain, abdominal pain, nausea vomiting, lower leg edema. Also reports good sleep    Vitals: Blood Pressure: 124/89 (11/12/24 0700)  Pulse: 63 (11/12/24 0700)  Temperature: 97.6 °F (36.4 °C) (11/12/24 0700)  Temp Source: Oral (11/12/24 0700)  Respirations: 18 (11/12/24 0700)  Height: 5' 8.9\" (175 cm) (11/09/24 2349)  Weight - Scale: 88 kg (194 lb 0.1 oz) (11/09/24 2349)  SpO2: 98 % (11/12/24 0959)    Physical Exam  Vitals and nursing note reviewed.   Constitutional:       Appearance: He is well-developed and normal weight.   HENT:      Head: Normocephalic and atraumatic.      Nose: Nose normal.      Mouth/Throat:      Mouth: Mucous membranes are moist.   Eyes:      Conjunctiva/sclera: Conjunctivae normal.   Cardiovascular:      Rate and Rhythm: Normal rate and regular rhythm.      Heart sounds: Normal heart sounds. No murmur heard.  Pulmonary:      Effort: Pulmonary effort is normal. No respiratory distress.      Breath sounds: Normal breath sounds.   Abdominal:      General: Abdomen is flat.      Palpations: Abdomen is soft.      Tenderness: There is no abdominal tenderness.   Musculoskeletal:         General: No swelling.      Cervical back: Neck supple.      Right lower leg: No edema.      Left lower leg: No edema.   Skin:     General: Skin is warm and " dry.      Capillary Refill: Capillary refill takes less than 2 seconds.   Neurological:      General: No focal deficit present.      Mental Status: He is alert and oriented to person, place, and time. Mental status is at baseline.   Psychiatric:         Mood and Affect: Mood normal.          Discussion with Family: Updated  (wife) via phone.    Discharge instructions/Information to patient and family:   See after visit summary for information provided to patient and family.      Provisions for Follow-Up Care:  See after visit summary for information related to follow-up care and any pertinent home health orders.      Mobility at time of Discharge:   Basic Mobility Inpatient Raw Score: 22  JH-HLM Goal: 7: Walk 25 feet or more  JH-HLM Achieved: 1: Laying in bed  HLM Goal achieved. Continue to encourage appropriate mobility.     Disposition:   Home    Planned Readmission: None    Discharge Medications:  See after visit summary for reconciled discharge medications provided to patient and/or family.      Administrative Statements   Discharge Statement:  I have spent a total time of 35 minutes in caring for this patient on the day of the visit/encounter. >30 minutes of time was spent on: Diagnostic results, Prognosis, Risks and benefits of tx options, Instructions for management, Patient and family education, Importance of tx compliance, Risk factor reductions, Impressions, Counseling / Coordination of care, Documenting in the medical record, Reviewing / ordering tests, medicine, procedures  , and Communicating with other healthcare professionals .    **Please Note: This note may have been constructed using a voice recognition system**

## 2024-11-12 NOTE — PLAN OF CARE
Problem: NEUROSENSORY - ADULT  Goal: Achieves maximal functionality and self care  Description: INTERVENTIONS  - Monitor swallowing and airway patency with patient fatigue and changes in neurological status  - Encourage and assist patient to increase activity and self care.   - Encourage visually impaired, hearing impaired and aphasic patients to use assistive/communication devices  11/12/2024 1420 by Estelita Corado RN  Outcome: Adequate for Discharge  11/12/2024 1420 by Estelita Corado RN  Outcome: Progressing  11/12/2024 1420 by Estelita Corado RN  Outcome: Progressing     Problem: HEMATOLOGIC - ADULT  Goal: Maintains hematologic stability  Description: INTERVENTIONS  - Assess for signs and symptoms of bleeding or hemorrhage  - Monitor labs  - Administer supportive blood products/factors as ordered and appropriate  11/12/2024 1420 by Estelita Corado RN  Outcome: Adequate for Discharge  11/12/2024 1420 by Estelita Corado RN  Outcome: Progressing  11/12/2024 1420 by Estelita Corado RN  Outcome: Progressing     Problem: MUSCULOSKELETAL - ADULT  Goal: Maintain or return mobility to safest level of function  Description: INTERVENTIONS:  - Assess patient's ability to carry out ADLs; assess patient's baseline for ADL function and identify physical deficits which impact ability to perform ADLs (bathing, care of mouth/teeth, toileting, grooming, dressing, etc.)  - Assess/evaluate cause of self-care deficits   - Assess range of motion  - Assess patient's mobility  - Assess patient's need for assistive devices and provide as appropriate  - Encourage maximum independence but intervene and supervise when necessary  - Involve family in performance of ADLs  - Assess for home care needs following discharge   - Consider OT consult to assist with ADL evaluation and planning for discharge  - Provide patient education as appropriate  11/12/2024 1420 by Estelita Corado RN  Outcome: Adequate for Discharge  11/12/2024 1420 by Estelita Corado RN  Outcome:  Progressing  11/12/2024 1420 by Estelita Corado RN  Outcome: Progressing  Goal: Maintain proper alignment of affected body part  Description: INTERVENTIONS:  - Support, maintain and protect limb and body alignment  - Provide patient/ family with appropriate education  11/12/2024 1420 by Estelita Corado RN  Outcome: Adequate for Discharge  11/12/2024 1420 by Estelita Corado RN  Outcome: Progressing  11/12/2024 1420 by Estelita Corado RN  Outcome: Progressing     Problem: GASTROINTESTINAL - ADULT  Goal: Maintains or returns to baseline bowel function  Description: INTERVENTIONS:  - Assess bowel function  - Encourage oral fluids to ensure adequate hydration  - Administer IV fluids if ordered to ensure adequate hydration  - Administer ordered medications as needed  - Encourage mobilization and activity  - Consider nutritional services referral to assist patient with adequate nutrition and appropriate food choices  11/12/2024 1420 by Estelita Corado RN  Outcome: Adequate for Discharge  11/12/2024 1420 by Estelita Corado RN  Outcome: Progressing  Goal: Maintains adequate nutritional intake  Description: INTERVENTIONS:  - Monitor percentage of each meal consumed  - Identify factors contributing to decreased intake, treat as appropriate  - Assist with meals as needed  - Monitor I&O, weight, and lab values if indicated  - Obtain nutrition services referral as needed  11/12/2024 1420 by Estelita Corado RN  Outcome: Adequate for Discharge  11/12/2024 1420 by Estelita Corado RN  Outcome: Progressing     Problem: GENITOURINARY - ADULT  Goal: Absence of urinary retention  Description: INTERVENTIONS:  - Assess patient’s ability to void and empty bladder  - Monitor I/O  - Bladder scan as needed  - Discuss with physician/AP medications to alleviate retention as needed  - Discuss catheterization for long term situations as appropriate  11/12/2024 1420 by Estelita Corado RN  Outcome: Adequate for Discharge  11/12/2024 1420 by Estelita Corado RN  Outcome:  Progressing     Problem: NEUROSENSORY - ADULT  Goal: Achieves maximal functionality and self care  Description: INTERVENTIONS  - Monitor swallowing and airway patency with patient fatigue and changes in neurological status  - Encourage and assist patient to increase activity and self care.   - Encourage visually impaired, hearing impaired and aphasic patients to use assistive/communication devices  11/12/2024 1420 by Estelita Corado RN  Outcome: Adequate for Discharge  11/12/2024 1420 by Estelita Corado RN  Outcome: Progressing  11/12/2024 1420 by Estelita Corado RN  Outcome: Progressing     Problem: HEMATOLOGIC - ADULT  Goal: Maintains hematologic stability  Description: INTERVENTIONS  - Assess for signs and symptoms of bleeding or hemorrhage  - Monitor labs  - Administer supportive blood products/factors as ordered and appropriate  11/12/2024 1420 by Estelita Corado RN  Outcome: Adequate for Discharge  11/12/2024 1420 by Estelita Corado RN  Outcome: Progressing  11/12/2024 1420 by Estelita Corado RN  Outcome: Progressing     Problem: MUSCULOSKELETAL - ADULT  Goal: Maintain or return mobility to safest level of function  Description: INTERVENTIONS:  - Assess patient's ability to carry out ADLs; assess patient's baseline for ADL function and identify physical deficits which impact ability to perform ADLs (bathing, care of mouth/teeth, toileting, grooming, dressing, etc.)  - Assess/evaluate cause of self-care deficits   - Assess range of motion  - Assess patient's mobility  - Assess patient's need for assistive devices and provide as appropriate  - Encourage maximum independence but intervene and supervise when necessary  - Involve family in performance of ADLs  - Assess for home care needs following discharge   - Consider OT consult to assist with ADL evaluation and planning for discharge  - Provide patient education as appropriate  11/12/2024 1420 by Estelita Corado RN  Outcome: Adequate for Discharge  11/12/2024 1420 by Estelita Corado  RN  Outcome: Progressing  11/12/2024 1420 by Estelita Corado RN  Outcome: Progressing  Goal: Maintain proper alignment of affected body part  Description: INTERVENTIONS:  - Support, maintain and protect limb and body alignment  - Provide patient/ family with appropriate education  11/12/2024 1420 by Estelita Corado RN  Outcome: Adequate for Discharge  11/12/2024 1420 by Estelita Corado RN  Outcome: Progressing  11/12/2024 1420 by Estelita Corado RN  Outcome: Progressing     Problem: GASTROINTESTINAL - ADULT  Goal: Maintains or returns to baseline bowel function  Description: INTERVENTIONS:  - Assess bowel function  - Encourage oral fluids to ensure adequate hydration  - Administer IV fluids if ordered to ensure adequate hydration  - Administer ordered medications as needed  - Encourage mobilization and activity  - Consider nutritional services referral to assist patient with adequate nutrition and appropriate food choices  11/12/2024 1420 by Estelita Corado RN  Outcome: Adequate for Discharge  11/12/2024 1420 by Estelita Coardo RN  Outcome: Progressing  Goal: Maintains adequate nutritional intake  Description: INTERVENTIONS:  - Monitor percentage of each meal consumed  - Identify factors contributing to decreased intake, treat as appropriate  - Assist with meals as needed  - Monitor I&O, weight, and lab values if indicated  - Obtain nutrition services referral as needed  11/12/2024 1420 by Estelita Corado RN  Problem: NEUROSENSORY - ADULT  Goal: Achieves maximal functionality and self care  Description: INTERVENTIONS  - Monitor swallowing and airway patency with patient fatigue and changes in neurological status  - Encourage and assist patient to increase activity and self care.   - Encourage visually impaired, hearing impaired and aphasic patients to use assistive/communication devices  11/12/2024 1421 by Estelita Corado RN  Outcome: Adequate for Discharge  11/12/2024 1420 by Estelita Corado RN  Outcome: Adequate for Discharge  11/12/2024  1420 by Estelita Corado RN  Outcome: Progressing  11/12/2024 1420 by Estelita Corado RN  Outcome: Progressing     Problem: HEMATOLOGIC - ADULT  Goal: Maintains hematologic stability  Description: INTERVENTIONS  - Assess for signs and symptoms of bleeding or hemorrhage  - Monitor labs  - Administer supportive blood products/factors as ordered and appropriate  11/12/2024 1421 by Estelita Corado RN  Outcome: Adequate for Discharge  11/12/2024 1420 by Estelita Corado RN  Outcome: Adequate for Discharge  11/12/2024 1420 by Estelita Corado RN  Outcome: Progressing  11/12/2024 1420 by Estelita Corado RN  Outcome: Progressing     Problem: MUSCULOSKELETAL - ADULT  Goal: Maintain or return mobility to safest level of function  Description: INTERVENTIONS:  - Assess patient's ability to carry out ADLs; assess patient's baseline for ADL function and identify physical deficits which impact ability to perform ADLs (bathing, care of mouth/teeth, toileting, grooming, dressing, etc.)  - Assess/evaluate cause of self-care deficits   - Assess range of motion  - Assess patient's mobility  - Assess patient's need for assistive devices and provide as appropriate  - Encourage maximum independence but intervene and supervise when necessary  - Involve family in performance of ADLs  - Assess for home care needs following discharge   - Consider OT consult to assist with ADL evaluation and planning for discharge  - Provide patient education as appropriate  11/12/2024 1421 by Estelita Corado RN  Outcome: Adequate for Discharge  11/12/2024 1420 by Estelita Corado RN  Outcome: Adequate for Discharge  11/12/2024 1420 by Estelita oCrado RN  Outcome: Progressing  11/12/2024 1420 by Estelita Corado RN  Outcome: Progressing  Goal: Maintain proper alignment of affected body part  Description: INTERVENTIONS:  - Support, maintain and protect limb and body alignment  - Provide patient/ family with appropriate education  11/12/2024 1421 by Estelita Corado RN  Outcome: Adequate for  Discharge  11/12/2024 1420 by Estelita Corado RN  Outcome: Adequate for Discharge  11/12/2024 1420 by Estelita Corado RN  Outcome: Progressing  11/12/2024 1420 by Estelita Corado RN  Outcome: Progressing     Problem: GASTROINTESTINAL - ADULT  Goal: Maintains or returns to baseline bowel function  Description: INTERVENTIONS:  - Assess bowel function  - Encourage oral fluids to ensure adequate hydration  - Administer IV fluids if ordered to ensure adequate hydration  - Administer ordered medications as needed  - Encourage mobilization and activity  - Consider nutritional services referral to assist patient with adequate nutrition and appropriate food choices  11/12/2024 1421 by Estelita Corado RN  Outcome: Adequate for Discharge  11/12/2024 1420 by Estelita Corado RN  Outcome: Adequate for Discharge  11/12/2024 1420 by Estelita Corado RN  Outcome: Progressing  Goal: Maintains adequate nutritional intake  Description: INTERVENTIONS:  - Monitor percentage of each meal consumed  - Identify factors contributing to decreased intake, treat as appropriate  - Assist with meals as needed  - Monitor I&O, weight, and lab values if indicated  - Obtain nutrition services referral as needed  11/12/2024 1421 by Estelita Corado RN  Outcome: Adequate for Discharge  11/12/2024 1420 by Estelita Corado RN  Outcome: Adequate for Discharge  11/12/2024 1420 by Estelita Corado RN  Outcome: Progressing     Problem: GENITOURINARY - ADULT  Goal: Absence of urinary retention  Description: INTERVENTIONS:  - Assess patient’s ability to void and empty bladder  - Monitor I/O  - Bladder scan as needed  - Discuss with physician/AP medications to alleviate retention as needed  - Discuss catheterization for long term situations as appropriate  11/12/2024 1421 by Estelita Corado RN  Outcome: Adequate for Discharge  11/12/2024 1420 by Estelita Corado RN  Outcome: Adequate for Discharge  11/12/2024 1420 by Estelita Corado RN  Outcome: Progressing     Outcome: Adequate for  Discharge  11/12/2024 1420 by Estelita Corado RN  Outcome: Progressing     Problem: GENITOURINARY - ADULT  Goal: Absence of urinary retention  Description: INTERVENTIONS:  - Assess patient’s ability to void and empty bladder  - Monitor I/O  - Bladder scan as needed  - Discuss with physician/AP medications to alleviate retention as needed  - Discuss catheterization for long term situations as appropriate  11/12/2024 1420 by Estelita Corado RN  Outcome: Adequate for Discharge  11/12/2024 1420 by Estelita Corado RN  Outcome: Progressing

## 2024-11-12 NOTE — PROGRESS NOTES
Progress Note - Orthopedics   Name: Garth Lo 44 y.o. male I MRN: [id number]  Unit/Bed#: -01 I Date of Admission: 11/9/2024   Date of Service: 11/12/2024 I Hospital Day: 1     Assessment & Plan  Intractable low back pain  - Acute on chronic low back pain with radiation into the bilateral lower extremities  WBAT bilateral lower extremities  PT/OT  Analgesics per primary team  DVT ppx per primary team  MRI of lumbar spine was reviewed with Dr. Richmond and with the patient today.   Dispo: Recommend conservative management in the form of analgesics for pain control and PT/OT. Upon discharge patient should follow-up outpatient with pain management to discuss the role of injections. There is no immediate orthopedic intervention planned while inpatient. Ortho signing off. If there are any additional questions or concerns, please reach out.   Cervical spine pain  - Acute on chronic cervical spine pain  WBAT bilateral upper extremities  PT/OT  Pain control per primary  MRI of cervical spine was reviewed with Dr. Richmond and with the patient today.   Dispo: See dispo plan above. Recommend conservative management involving physical therapy and pain management. No interventions planned at this time.     Orthopedics service signing off.    Subjective   44 y.o.male currently admitted due to low back pain and neck pain. Patient reports he continues to experience pain across his low back that radiates into the anterior bilateral thighs, ending above the knee. Patient reports he has minimal neck pain at this time. Patient denies any pain going into bilateral upper extremities. Patient mentions he has episodic numbness going into the fourth and fifth digits of the right hand. Patient offers no additional complaints.       Objective :  Temp:  [97 °F (36.1 °C)-98.2 °F (36.8 °C)] 97.6 °F (36.4 °C)  HR:  [63-75] 63  BP: (104-124)/(59-89) 124/89  Resp:  [18] 18  SpO2:  [95 %-99 %] 98 %  O2 Device: None (Room air)    Physical  "Exam  Musculoskeletal: bilateral upper and lower extremities  Skin: Extremities appear well perfused overall. No erythema or ecchymosis.  Sensation intact to median/radial/ulnar nerve distribution   5/5 Motor strength with  strength, finger abduction, finger flexion, wrist flexion/extension, biceps, triceps, and deltoid bilaterally  5/5 Motor strength with FHL/EHL, ankle dorsi/plantar flexion, knee flexion/extension, hip flexion/extension bilaterally  Sensation intact to saphenous, sural, tibial, superficial peroneal nerve, and deep peroneal  2+ DP pulse bilaterally      Lab Results: I have reviewed the following results:  Recent Labs     11/09/24  2322 11/11/24  0435 11/12/24  0556   WBC 13.18* 21.93* 11.94*   HGB 15.9 14.6 14.2   HCT 47.2 44.6 44.0    289 228   BUN 13  --   --    CREATININE 1.06  --   --      Blood Culture:  No results found for: \"BLOODCX\"  Wound Culture: No results found for: \"WOUNDCULT\"    Radiographic Studies:   MRI cervical spine wo contrast   Final Result by Ezra Dueñas MD (11/12 0958)      Multilevel cervical degenerative disc disease as detailed with moderate central canal narrowing at the C4-5 level. Moderate bilateral C4-5, bilateral C5-6, and right C6-7 neural foraminal narrowing.      The cervical cord appears normal in caliber and signal with no evidence of focal impingement.               Resident: DERICK TEMPLE I, the attending radiologist, have reviewed the images and agree with the final report above.      Workstation performed: [id number]         MRI lumbar spine wo contrast   Final Result by Ezra Dueñas MD (11/12 0951)      Spondylosis at the L4-L5 disc space with left foraminal extrusion abutting the descending L5 nerve root with encroachment-correlate with radiculopathy symptoms.         Resident: DERICK TEMPLE I, the attending radiologist, have reviewed the images and agree with the final report above.      Workstation performed: " [id number]         XR spine lumbar minimum 4 views non injury   Final Result by Nasir Duffy MD (11/11 0703)      No acute osseous abnormality.         Computerized Assisted Algorithm (CAA) may have been used to analyze all applicable images.         Workstation performed: [id number]

## 2024-11-12 NOTE — OCCUPATIONAL THERAPY NOTE
Occupational Therapy Cancellation Note        Patient Name: Garth Lo  Today's Date: 11/12/2024 11/12/24 0925   OT Last Visit   OT Visit Date 11/12/24   Note Type   Note type Cancelled Session   Additional Comments OT order received. Chart review performed. At this time, OT evaluation cancelled due to as pt pending formal read on spine MRIs. OT will continue to follow and evaluate as patient is medically appropriate for skilled OT interventions.         Gisel Hurst MS OTR/L

## 2024-11-12 NOTE — ASSESSMENT & PLAN NOTE
Episode while in the ED of hypotension after receiving IV morphine 6 mg.  Patient was in severe pain when he went to the emergency department  Patient is very upset because he was given this high dose of morphine.  I did explain to him that he was in a lot of pain during that time from chart review and they thought that it was appropriate.  Currently stabilized and no longer low blood pressure.